# Patient Record
Sex: MALE | Race: WHITE | NOT HISPANIC OR LATINO | Employment: FULL TIME | URBAN - METROPOLITAN AREA
[De-identification: names, ages, dates, MRNs, and addresses within clinical notes are randomized per-mention and may not be internally consistent; named-entity substitution may affect disease eponyms.]

---

## 2017-01-10 ENCOUNTER — HOSPITAL ENCOUNTER (OUTPATIENT)
Dept: RADIOLOGY | Facility: MEDICAL CENTER | Age: 37
End: 2017-01-10
Attending: ORTHOPAEDIC SURGERY
Payer: COMMERCIAL

## 2017-01-10 DIAGNOSIS — M17.11 PRIMARY OSTEOARTHRITIS OF RIGHT KNEE: ICD-10-CM

## 2017-01-10 PROCEDURE — 73721 MRI JNT OF LWR EXTRE W/O DYE: CPT | Mod: RT

## 2017-04-17 ENCOUNTER — HOSPITAL ENCOUNTER (EMERGENCY)
Facility: MEDICAL CENTER | Age: 37
End: 2017-04-17
Attending: EMERGENCY MEDICINE
Payer: COMMERCIAL

## 2017-04-17 ENCOUNTER — NON-PROVIDER VISIT (OUTPATIENT)
Dept: OCCUPATIONAL MEDICINE | Facility: CLINIC | Age: 37
End: 2017-04-17
Payer: COMMERCIAL

## 2017-04-17 VITALS
WEIGHT: 315 LBS | DIASTOLIC BLOOD PRESSURE: 108 MMHG | HEIGHT: 72 IN | BODY MASS INDEX: 42.66 KG/M2 | TEMPERATURE: 98.6 F | RESPIRATION RATE: 16 BRPM | HEART RATE: 88 BPM | SYSTOLIC BLOOD PRESSURE: 156 MMHG

## 2017-04-17 DIAGNOSIS — Z02.1 PRE-EMPLOYMENT DRUG SCREENING: ICD-10-CM

## 2017-04-17 DIAGNOSIS — Z20.89 MENINGITIS EXPOSURE: ICD-10-CM

## 2017-04-17 DIAGNOSIS — Z02.83 ENCOUNTER FOR DRUG SCREENING: ICD-10-CM

## 2017-04-17 PROCEDURE — 80305 DRUG TEST PRSMV DIR OPT OBS: CPT | Performed by: INTERNAL MEDICINE

## 2017-04-17 PROCEDURE — 8899 PR URINE 11 PANEL - AFTER HOURS: Performed by: INTERNAL MEDICINE

## 2017-04-17 PROCEDURE — 700102 HCHG RX REV CODE 250 W/ 637 OVERRIDE(OP): Performed by: EMERGENCY MEDICINE

## 2017-04-17 PROCEDURE — A9270 NON-COVERED ITEM OR SERVICE: HCPCS | Performed by: EMERGENCY MEDICINE

## 2017-04-17 PROCEDURE — 99283 EMERGENCY DEPT VISIT LOW MDM: CPT

## 2017-04-17 PROCEDURE — 82075 ASSAY OF BREATH ETHANOL: CPT | Performed by: INTERNAL MEDICINE

## 2017-04-17 RX ORDER — CIPROFLOXACIN 500 MG/1
500 TABLET, FILM COATED ORAL ONCE
Status: COMPLETED | OUTPATIENT
Start: 2017-04-17 | End: 2017-04-17

## 2017-04-17 RX ADMIN — CIPROFLOXACIN HYDROCHLORIDE 500 MG: 500 TABLET, FILM COATED ORAL at 05:16

## 2017-04-17 NOTE — ED PROVIDER NOTES
"ED Provider Note    Scribed for Rebecca Herrera M.D. by Johanne Painter. 4/17/2017  6:45 AM    Primary care provider: Pcp Unknown  Means of arrival: Walk-In  History obtained from: Patient  History limited by: None    CHIEF COMPLAINT  Chief Complaint   Patient presents with   • Other     possible exposure to bacterial meningitis       HPI  Oswald Schuler is a 36 y.o. male who presents to the Emergency Department for a possible exposure to meningitis. The patient was exposed to a patient that had the possibility of bacterial meningitis. he denies any fevers, neck pain, or rashes.     REVIEW OF SYSTEMS  Neuro: no weakness, numbness, aphasia, or headache  Endocrine: no fevers, sweating, or weight loss   SKIN: no rash, erythema, or contusions     See history of present illness.     PAST MEDICAL HISTORY   has a past medical history of Coccyx pain and BMI 38.0-38.9,adult (8/9/2012).    SURGICAL HISTORY   has past surgical history that includes tonsillectomy; cyst excision; and mass excision general (2/10/2012).    SOCIAL HISTORY  Social History   Substance Use Topics   • Smoking status: Light Tobacco Smoker     Types: Cigarettes   • Smokeless tobacco: Never Used      Comment: pack a week for 4 yrs NONE anymore   • Alcohol Use: 3.0 - 4.0 oz/week     6-8 Standard drinks or equivalent per week      Comment: once a week      History   Drug Use No       FAMILY HISTORY  Family History   Problem Relation Age of Onset   • Hypertension Father    • Cancer Maternal Grandfather        CURRENT MEDICATIONS  No current facility-administered medications on file prior to encounter.     Current Outpatient Prescriptions on File Prior to Encounter   Medication Sig Dispense Refill   • hydrochlorothiazide (HYDRODIURIL) 12.5 MG tablet Take 1 Tab by mouth every day. 90 Tab 0       ALLERGIES  No Known Allergies    PHYSICAL EXAM  VITAL SIGNS: /108 mmHg  Pulse 88  Temp(Src) 37 °C (98.6 °F)  Resp 16  Ht 1.829 m (6' 0.01\")  Wt " 149.687 kg (330 lb)  BMI 44.75 kg/m2    Constitutional: Well developed, Well nourished, No acute distress, Non-toxic appearance.   HEENT: Normocephalic, Atraumatic,  external ears normal, pharynx pink,  Mucous  Membranes moist, No rhinorrhea or mucosal edema  Eyes: PERRL, EOMI, Conjunctiva normal, No discharge.   Neck: Normal range of motion, No tenderness, Supple, No stridor.   Lymphatic: No lymphadenopathy    Cardiovascular: Regular Rate and Rhythm, No murmurs,  rubs, or gallops.   Thorax & Lungs: Lungs clear to auscultation bilaterally, No respiratory distress, No wheezes, rhales or rhonchi, No chest wall tenderness.   , No erythema, No rash,   Neurologic: Alert & awake, no focal deficits  Psychiatric: Affect normal    DIAGNOSTIC STUDIES / PROCEDURES    LABS  CSF/ Body Fluids (from patient number [id number])  Number of tubes: 3  Volume: 24.0  Color-Body Fluid: Colorless  Character Body Fluid: Clear  Total WBC Count: 4  Total RBC: 4  Crenated RBC: 0  Lymphs: 77  Mononuclear Cells 23  CSF Tube number: 3  Glucose CSF: 57  Total protein, CSF: 32  Significant Indicator  .         Source  CSF        Site  TAP        Gram Stain Result  Many Gram negative rods.              All labs reviewed by me.    The second set of cultures is pending and will be followed up by workman's comp.    COURSE & MEDICAL DECISION MAKING  Nursing notes, VS, PMSFHx reviewed in chart.     6:45 AM - Patient seen and examined at bedside. Patient will be treated with 500mg Cipro.     6:53 AM The patient was informed that he will be notified of the lab results and followed up by workman's comp. He understood and verbalized agreement.     The patient will return for new or worsening symptoms and is stable at the time of discharge.    The patient is referred to a primary physician for blood pressure management, diabetic screening, and for all other preventative health concerns.    DISPOSITION:  Patient will be discharged home in stable  condition.    FOLLOW UP:  [facility]  [address]  [place] [address]  [phone]    Call in 1 day  to establish care, for recheck    FINAL IMPRESSION  1. Meningitis exposure          I, Johanne Painter (Scribe), am scribing for, and in the presence of, Rebecca Herrera M.D..    Electronically signed by: Johanne Painter (Yazmin), 4/17/2017    IRebecca M.D. personally performed the services described in this documentation, as scribed by Johanne Painter in my presence, and it is both accurate and complete.    The note accurately reflects work and decisions made by me.  Rebecca Herrera  4/17/2017  1:51 PM

## 2017-04-17 NOTE — ED AVS SNAPSHOT
TutorialTab Access Code: Activation code not generated  Current TutorialTab Status: Active    Vaxxashart  A secure, online tool to manage your health information     [employer]’s TutorialTab® is a secure, online tool that connects you to your personalized health information from the privacy of your home -- day or night - making it very easy for you to manage your healthcare. Once the activation process is completed, you can even access your medical information using the TutorialTab tex, which is available for free in the Apple Tex store or Google Play store.     TutorialTab provides the following levels of access (as shown below):   My Chart Features   [facility] Primary Care Doctor [facility]  Specialists [facility]  Urgent  Care [facility]  Primary Care  Doctor   Email your healthcare team securely and privately 24/7 X X X X   Manage appointments: schedule your next appointment; view details of past/upcoming appointments X      Request prescription refills. X      View recent personal medical records, including lab and immunizations X X X X   View health record, including health history, allergies, medications X X X X   Read reports about your outpatient visits, procedures, consult and ER notes X X X X   See your discharge summary, which is a recap of your hospital and/or ER visit that includes your diagnosis, lab results, and care plan. X X       How to register for TutorialTab:  1. Go to  [email or web address].  2. Click on the Sign Up Now box, which takes you to the New Member Sign Up page. You will need to provide the following information:  a. Enter your TutorialTab Access Code exactly as it appears at the top of this page. (You will not need to use this code after you’ve completed the sign-up process. If you do not sign up before the expiration date, you must request a new code.)   b. Enter your date of birth.   c. Enter your home email address.   d. Click Submit, and follow the next screen’s instructions.  3. Create a TutorialTab ID. This will  be your shopkick login ID and cannot be changed, so think of one that is secure and easy to remember.  4. Create a shopkick password. You can change your password at any time.  5. Enter your Password Reset Question and Answer. This can be used at a later time if you forget your password.   6. Enter your e-mail address. This allows you to receive e-mail notifications when new information is available in shopkick.  7. Click Sign Up. You can now view your health information.    For assistance activating your shopkick account, call [phone]

## 2017-04-17 NOTE — ED AVS SNAPSHOT
4/17/2017    Oswald Schuler  [address] [place] [address]    Dear Oswald:    [facility] wants to ensure your discharge home is safe and you or your loved ones have had all of your questions answered regarding your care after you leave the hospital.    Below is a list of resources and contact information should you have any questions regarding your hospital stay, follow-up instructions, or active medical symptoms.    Questions or Concerns Regarding… Contact   Medical Questions Related to Your Discharge  (7 days a week, 8am-5pm) Contact a Nurse Care Coordinator   [phone]   Medical Questions Not Related to Your Discharge  (24 hours a day / 7 days a week)  Contact the Nurse Health Line   [phone]    Medications or Discharge Instructions Refer to your discharge packet   or contact your [facility] Primary Care Provider   [phone]   Follow-up Appointment(s) Schedule your appointment via Mzinga   or contact Scheduling [phone]   Billing Review your statement via Mzinga  or contact Billing [phone]   Medical Records Review your records via Mzinga   or contact Medical Records [phone]     You may receive a telephone call within two days of discharge. This call is to make certain you understand your discharge instructions and have the opportunity to have any questions answered. You can also easily access your medical information, test results and upcoming appointments via the Mzinga free online health management tool. You can learn more and sign up at [email or web address]. For assistance setting up your Mzinga account, please call [phone].    Once again, we want to ensure your discharge home is safe and that you have a clear understanding of any next steps in your care. If you have any questions or concerns, please do not hesitate to contact us, we are here for you. Thank you for choosing [facility] for your healthcare needs.    Sincerely,    Your [facility] Healthcare Team

## 2017-04-17 NOTE — MR AVS SNAPSHOT
Oswald Schuler   2017 5:00 PM   Appointment   MRN: [id number]    Department:  [facility]   Dept Phone:  [phone]    Description:  Male : 1980   Provider:  [place] [facility]           Allergies as of 2017     No Known Allergies      Vital Signs     Smoking Status                   Light Tobacco Smoker           Basic Information     Date Of Birth Sex Race Ethnicity Preferred Language    1980 Male White Non- English      Problem List              ICD-10-CM Priority Class Noted - Resolved    Morbid obesity with BMI of 40.0-44.9, adult (CMS-[facility]) E66.01, Z68.41   2012 - Present    Essential hypertension I10   2016 - Present    Elevated TSH R94.6   2016 - Present      Health Maintenance        Date Due Completion Dates    IMM DTaP/Tdap/Td Vaccine (2 - Td) 2023, 2011            Current Immunizations     Influenza Vaccine Quad Inj (Preserved) 2016    TD Vaccine 2011    Tdap Vaccine 2013  4:37 PM    Tuberculin Skin Test 2014  7:25 AM, 2013  7:45 AM, 2012  6:43 AM, 2011  8:05 AM      Below and/or attached are the medications your provider expects you to take. Review all of your home medications and newly ordered medications with your provider and/or pharmacist. Follow medication instructions as directed by your provider and/or pharmacist. Please keep your medication list with you and share with your provider. Update the information when medications are discontinued, doses are changed, or new medications (including over-the-counter products) are added; and carry medication information at all times in the event of emergency situations     Allergies:  No Known Allergies          Medications  Valid as of: May 01, 2017 - 11:21 AM    Generic Name Brand Name Tablet Size Instructions for use    HydroCHLOROthiazide (Tab) HYDRODIURIL 12.5 MG Take 1 Tab by mouth every day.        .                 Medicines prescribed  today were sent to:     [facility] [place], [place] - [address]    [address] [address]    Phone: [phone] Fax: [phone]    Open 24 Hours?: No      Medication refill instructions:       If your prescription bottle indicates you have medication refills left, it is not necessary to call your provider’s office. Please contact your pharmacy and they will refill your medication.    If your prescription bottle indicates you do not have any refills left, you may request refills at any time through one of the following ways: The online HourVille system (except Urgent Care), by calling your provider’s office, or by asking your pharmacy to contact your provider’s office with a refill request. Medication refills are processed only during regular business hours and may not be available until the next business day. Your provider may request additional information or to have a follow-up visit with you prior to refilling your medication.   *Please Note: Medication refills are assigned a new Rx number when refilled electronically. Your pharmacy may indicate that no refills were authorized even though a new prescription for the same medication is available at the pharmacy. Please request the medicine by name with the pharmacy before contacting your provider for a refill.           H[facility]ille Access Code: Activation code not generated  Current [facility] Status: Active          Quit Tobacco Information     Do you want to quit using tobacco?    Quitting tobacco decreases risks of cancer, heart and lung disease, increases life expectancy, improves sense of taste and smell, and increases spending money, among other benefits.    If you are thinking about quitting, we can help.  • [facility] Quit Tobacco Program: 262.766.7857  o Program occurs weekly for four weeks and includes pharmacist consultation on products to support quitting smoking or chewing tobacco. A provider referral is needed for pharmacist  consultation.  • Tobacco Users Help Hotline: 800-QUIT-NOW (523-3823) or https://[place].quitlogix.org/  o Free, confidential telephone and online coaching for [place] residents. Sessions are designed on a schedule that is convenient for you. Eligible clients receive free nicotine replacement therapy.  • Nationally: www.smokefree.gov  o Information and professional assistance to support both immediate and long-term needs as you become, and remain, a non-smoker. Smokefree.gov allows you to choose the help that best fits your needs.

## 2017-04-17 NOTE — LETTER
"  FORM C-4:  EMPLOYEE’S CLAIM FOR COMPENSATION/ REPORT OF INITIAL TREATMENT  EMPLOYEE’S CLAIM - PROVIDE ALL INFORMATION REQUESTED   First Name  Oswald Last Name  Ganga Birthdate             Age  1980 36 y.o. 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 (if applicable)  Triage    How did this injury or occupational disease occur? Be specific and answer in detail. 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  N/A Witnesses to the Accident  None     Nature of Injury or Occupational Disease  Workers' Compensation  Part(s) of Body Injured or Affected  Internal Organs, N/A, N/A    I certify that the above is true and correct to the best of my knowledge and that I have provided this information in order to obtain the " benefits of [place] inclusive or Chapter 617 of [place]RS).  I hereby authorize any physician, chiropractor, surgeon, practitioner, or other person, any hospital, including [facility] hospital, any medical service organization, any insurance company, or other institution or organization to release to each other, any medical or other information, including benefits paid or payable, pertinent to this injury or disease, except information relative to diagnosis, treatment and/or counseling for AIDS, psychological conditions, alcohol or controlled substances, for which I must give specific authorization.  A Photostat of this authorization shall be as valid as the original.   Date  4/17/2017 [facility] Employee’s Signature   THIS REPORT MUST BE COMPLETED AND MAILED WITHIN 3 WORKING DAYS OF TREATMENT   Place  [facility], EMERGENCY DEPT  Name of Facility   [facility]   Date  4/17/2017 Diagnosis  (Z20.89) Meningitis exposure Is there evidence the injured employee was under the influence of alcohol and/or another controlled substance at the time of accident?   Hour  7:22 AM Description of Injury or Disease  Meningitis exposure     Treatment     Have you advised the patient to remain off work five days or more?             X-Ray Findings      If Yes   From Date    To Date      From information given by the employee, together with medical evidence, can you directly connect this injury or occupational disease as job incurred?    If No, is the employee capable of: Full Duty    Modified Duty      Is additional medical care by a physician indicated?    If Modified Duty, Specify any Limitations / Restrictions        Do you know of any previous injury or disease contributing to this condition or occupational disease?      Date  4/17/2017 Print Doctor’s Name  Rebecca Herrera I certify the  "employer’s copy of this form was mailed on:   Address  [address] [address] [phone] Insurer’s Use Only   [facility]  [address]    Provider’s Tax ID Number  [id number] Telephone  Dept: [phone]    Doctor’s Signature    Degree   MD    Original - TREATING PHYSICIAN OR CHIROPRACTOR   Pg 2-Insurer/TPA   Pg 3-Employer   Pg 4-Employee                                                                                                  Form C-4 (rev01/03)     BRIEF DESCRIPTION OF RIGHTS AND BENEFITS  (Pursuant to NRS 616C.050)    Notice of Injury or Occupational Disease (Incident Report Form C-1): If an injury or occupational disease (OD) arises out of and in the course of employment, you must provide written notice to your employer as soon as practicable, but no later than 7 days after the accident or OD. Your employer shall maintain a sufficient supply of the required forms.    Claim for Compensation (Form C-4): If medical treatment is sought, the form C-4 is available at the place of initial treatment. A completed \"Claim for Compensation\" (Form C-4) must be filed within 90 days after an accident or OD. The treating physician or chiropractor must, within 3 working days after treatment, complete and mail to the employer, the employer's insurer and third-party , the Claim for Compensation.    Medical Treatment: If you require medical treatment for your on-the-job injury or OD, you may be required to select a physician or chiropractor from a list provided by your workers’ compensation insurer, if it has contracted with an Organization for Managed Care (MCO) or Preferred Provider Organization (PPO) or providers of health care. If your employer has not entered into a contract with an MCO or PPO, you may select a physician or chiropractor from the Panel of Physicians and Chiropractors. Any medical costs related to your industrial injury or OD will be paid by your " insurer.    Temporary Total Disability (TTD): If your doctor has certified that you are unable to work for a period of at least 5 consecutive days, or 5 cumulative days in a 20-day period, or places restrictions on you that your employer does not accommodate, you may be entitled to TTD compensation.    Temporary Partial Disability (TPD): If the wage you receive upon reemployment is less than the compensation for TTD to which you are entitled, the insurer may be required to pay you TPD compensation to make up the difference. TPD can only be paid for a maximum of 24 months.    Permanent Partial Disability (PPD): When your medical condition is stable and there is an indication of a PPD as a result of your injury or OD, within 30 days, your insurer must arrange for an evaluation by a rating physician or chiropractor to determine the degree of your PPD. The amount of your PPD award depends on the date of injury, the results of the PPD evaluation and your age and wage.    Permanent Total Disability (PTD): If you are medically certified by a treating physician or chiropractor as permanently and totally disabled and have been granted a PTD status by your insurer, you are entitled to receive monthly benefits not to exceed 66 2/3% of your average monthly wage. The amount of your PTD payments is subject to reduction if you previously received a PPD award.    Vocational Rehabilitation Services: You may be eligible for vocational rehabilitation services if you are unable to return to the job due to a permanent physical impairment or permanent restrictions as a result of your injury or occupational disease.    Transportation and Per Mercy Reimbursement: You may be eligible for travel expenses and per mercy associated with medical treatment.  Reopening: You may be able to reopen your claim if your condition worsens after claim closure.    Appeal Process: If you disagree with a written determination issued by the insurer or the  insurer does not respond to your request, you may appeal to the Department of Administration, , by following the instructions contained in your determination letter. You must appeal the determination within 70 days from the date of the determination letter at [address], [address], [place], [place] [address], or [address], [address], [place], [place] [address]. If you disagree with the  decision, you may appeal to the Department of Administration, . You must file your appeal within 30 days from the date of the  decision letter at [address], [address], [place], [place] [address], or [address], [address], [place], [place] [address]. If you disagree with a decision of an , you may file a petition for judicial review with the District Court. You must do so within 30 days of the Appeal Officer’s decision. You may be represented by an  at your own expense or you may contact the [employer] for possible representation.    [place]  for Injured Workers (NAIW): If you disagree with a  decision, you may request that NAIW represent you without charge at an  Hearing. For information regarding denial of benefits, you may contact the [employer] at: [address], [address], [place] [address], [phone]) [phone], or [address], [address], [place] [address], [phone]    To File a Complaint with the Division: If you wish to file a complaint with the  of the Division of Industrial Relations (DIR), please contact the Workers’ Compensation Section, [address], [address], [place], [place] [address], telephone (893) 470-7704, or [address], [place] [address], telephone [phone].    For assistance with Workers’ Compensation Issues: you may contact the Office of the [facility] Consumer Health Assistance, [address], [address], [place], [place] [address], Toll Free 1-874.852.7445, Web site: http://tacha.[facility].nv., E-mail [email or web address].[email or web address].                                                                                                                                                                               __________________________________________________________________                                    ________4/17/2017_________            Employee Name / Signature                                                                                                                            Date                                       D-2 (rev. 10/07)

## 2017-04-17 NOTE — DISCHARGE INSTRUCTIONS
Follow up with workman's comp for culture results and further testing as needed. Return for headache, fever or worsening symptoms

## 2017-04-17 NOTE — LETTER
"  FORM C-4:  EMPLOYEE’S CLAIM FOR COMPENSATION/ REPORT OF INITIAL TREATMENT  EMPLOYEE’S CLAIM - PROVIDE ALL INFORMATION REQUESTED   First Name  Oswald Last Name  Ganga Birthdate             Age  1980 36 y.o. 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 (if applicable)  Triage    How did this injury or occupational disease occur? Be specific and answer in detail. 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  N/A Witnesses to the Accident  None     Nature of Injury or Occupational Disease  Workers' Compensation  Part(s) of Body Injured or Affected  Internal Organs, N/A, N/A    I certify that the above is true and correct to the best of my knowledge and that I have provided this information in order to obtain the " benefits of [place] 616[place] 616[place], inclusive or Chapter 617 of [place]RS).  I hereby authorize any physician, chiropractor, surgeon, practitioner, or other person, any hospital, including [facility] hospital, any medical service organization, any insurance company, or other institution or organization to release to each other, any medical or other information, including benefits paid or payable, pertinent to this injury or disease, except information relative to diagnosis, treatment and/or counseling for AIDS, psychological conditions, alcohol or controlled substances, for which I must give specific authorization.  A Photostat of this authorization shall be as valid as the original.   Date Place  [facility] Employee’s Signature   THIS REPORT MUST BE COMPLETED AND MAILED WITHIN 3 WORKING DAYS OF TREATMENT   Place  [facility], EMERGENCY DEPT  Name of Facility   [facility]   Date  4/17/2017 Diagnosis  (Z20.89) Meningitis exposure Is there evidence the injured employee was under the influence of alcohol and/or another controlled substance at the time of accident?   Hour  10:30 AM Description of Injury or Disease  Meningitis exposure     Treatment  cipro  Have you advised the patient to remain off work five days or more?         No   X-Ray Findings    Comments:na   If Yes   From Date    To Date      From information given by the employee, together with medical evidence, can you directly connect this injury or occupational disease as job incurred?  Yes If No, is the employee capable of: Full Duty  Yes Modified Duty      Is additional medical care by a physician indicated?  Yes If Modified Duty, Specify any Limitations / Restrictions        Do you know of any previous injury or disease contributing to this condition or occupational disease?  No   Date  4/19/2017 Print Doctor’s Name  Rebecca Herrera  "certify the employer’s copy of this form was mailed on:   Address  [address]  [place] [address] [phone] Insurer’s Use Only   [facility]  [address]    Provider’s Tax ID Number  [id number] Telephone  Dept: [phone]    Doctor’s Signature  e-MICHAEL Dey M.D. Degree  MTHELMA.    Original - TREATING PHYSICIAN OR CHIROPRACTOR   Pg 2-Insurer/TPA   Pg 3-Employer   Pg 4-Employee                                                                                                  Form C-4 (rev01/03)   BRIEF DESCRIPTION OF RIGHTS AND BENEFITS  ([place]ursuant to [place]RS 616C.050)  Notice of Injury or Occupational Disease (Incident Report Form C-1): If an injury or occupational disease (OD) arises out of and in the course of employment, you must provide written notice to your employer as soon as practicable, but no later than 7 days after the accident or OD. Your employer shall maintain a sufficient supply of the required forms.    Claim for Compensation (Form C-4): If medical treatment is sought, the form C-4 is available at the place of initial treatment. A completed \"Claim for Compensation\" (Form C-4) must be filed within 90 days after an accident or OD. The treating physician or chiropractor must, within 3 working days after treatment, complete and mail to the employer, the employer's insurer and third-party , the Claim for Compensation.    Medical Treatment: If you require medical treatment for your on-the-job injury or OD, you may be required to select a physician or chiropractor from a list provided by your workers’ compensation insurer, if it has contracted with an Organization for Managed Care (MCO) or Preferred Provider Organization (PPO) or providers of health care. If your employer has not entered into a contract with an MCO or PPO, you may select a physician or chiropractor from the Panel of Physicians and Chiropractors. Any medical costs related to your industrial injury or OD " will be paid by your insurer.    Temporary Total Disability (TTD): If your doctor has certified that you are unable to work for a period of at least 5 consecutive days, or 5 cumulative days in a 20-day period, or places restrictions on you that your employer does not accommodate, you may be entitled to TTD compensation.    Temporary Partial Disability (TPD): If the wage you receive upon reemployment is less than the compensation for TTD to which you are entitled, the insurer may be required to pay you TPD compensation to make up the difference. TPD can only be paid for a maximum of 24 months.    Permanent Partial Disability (PPD): When your medical condition is stable and there is an indication of a PPD as a result of your injury or OD, within 30 days, your insurer must arrange for an evaluation by a rating physician or chiropractor to determine the degree of your PPD. The amount of your PPD award depends on the date of injury, the results of the PPD evaluation and your age and wage.    Permanent Total Disability (PTD): If you are medically certified by a treating physician or chiropractor as permanently and totally disabled and have been granted a PTD status by your insurer, you are entitled to receive monthly benefits not to exceed 66 2/3% of your average monthly wage. The amount of your PTD payments is subject to reduction if you previously received a PPD award.    Vocational Rehabilitation Services: You may be eligible for vocational rehabilitation services if you are unable to return to the job due to a permanent physical impairment or permanent restrictions as a result of your injury or occupational disease.    Transportation and Per Mercy Reimbursement: You may be eligible for travel expenses and per mercy associated with medical treatment.  Reopening: You may be able to reopen your claim if your condition worsens after claim closure.    Appeal Process: If you disagree with a written determination issued by the  insurer or the insurer does not respond to your request, you may appeal to the Department of Administration, , by following the instructions contained in your determination letter. You must appeal the determination within 70 days from the date of the determination letter at [address], [address], [place], [place] [address], or [address], [address], [place], [place] [address]. If you disagree with the  decision, you may appeal to the Department of Administration, . You must file your appeal within 30 days from the date of the  decision letter at [address], [address], [place], [place] [address], or [address], [place], [place] [address]. If you disagree with a decision of an , you may file a petition for judicial review with the District Court. You must do so within 30 days of the Appeal Officer’s decision. You may be represented by an  at your own expense or you may contact the [facility] for possible representation.    [place]  for Injured Workers ([place]IW): If you disagree with a  decision, you may request that NAIW represent you without charge at an  Hearing. For information regarding denial of benefits, you may contact the [facility] at: [address], [address], [place] [address], [phone]) [phone], or [address], [place] [address], [phone]    To File a Complaint with the Division: If you wish to file a complaint with the  of the Division of Industrial Relations (DIR), please contact the Workers’ Compensation Section, [address], [address], [place], [place] [address], telephone (218) 545-3734, or [address], [place] [address], telephone [phone]) [phone].    For assistance with Workers’ Compensation Issues: you may contact the Office of the Governor Consumer Health  Assistance, [address], [address], [place], [place] [address], Toll Free 1-648.584.7842, Web site: http://sweta.[facility].nv., E-mail [email or web address].                                                                                                                                                         __________________________________________________________________                                    _________________            Employee Name / Signature                                                                                                                            Date                                       D-2 (rev. 10/07)

## 2017-04-17 NOTE — ED NOTES
Oswald Trejo [facility]  Chief Complaint   Patient presents with   • Other     possible exposure to bacterial meningitis     Medicated per MAR.

## 2017-04-17 NOTE — ED AVS SNAPSHOT
Home Care Instructions                                                                                                                Oswald Schuler   MRN: [id number]    Department:  [facility], Emergency Dept   Date of Visit:  4/17/2017            [facility], Emergency Dept    [address]    [place] [address]    Phone:  [phone]      You were seen by     Rebecca Herrera M.D.      Your Diagnosis Was     Meningitis exposure     Z20.89       These are the medications you received during your hospitalization from 04/17/2017 0507 to 04/17/2017 0814     Date/Time Order Dose Route Action    04/17/2017 0516 ciprofloxacin (CIPRO) tablet 500 mg 500 mg Oral Given      Follow-up Information     1. Follow up with [facility]. Call in 1 day.    Why:  to establish care, for recheck    Contact information    [address] [place] [address] [phone]        Medication Information     Review all of your home medications and newly ordered medications with your primary doctor and/or pharmacist as soon as possible. Follow medication instructions as directed by your doctor and/or pharmacist.     Please keep your complete medication list with you and share with your physician. Update the information when medications are discontinued, doses are changed, or new medications (including over-the-counter products) are added; and carry medication information at all times in the event of emergency situations.               Medication List      ASK your doctor about these medications        Instructions    Morning Afternoon Evening Bedtime    hydrochlorothiazide 12.5 MG tablet   Commonly known as:  HYDRODIURIL        Doctor's comments:  Pt to make appt and get labs prior to more refills. This is an authorization for a requested refill   Take 1 Tab by mouth every day.   Dose:  12.5 mg                                  Discharge Instructions       Follow up with workman's comp for  culture results and further testing as needed. Return for headache, fever or worsening symptoms          Patient Information     Patient Information    Following emergency treatment: all patient requiring follow-up care must return either to a private physician or a clinic if your condition worsens before you are able to obtain further medical attention, please return to the emergency room.     Billing Information    At [facility], we work to make the billing process streamlined for our patients.  Our Representatives are here to answer any questions you may have regarding your hospital bill.  If you have insurance coverage and have supplied your insurance information to us, we will submit a claim to your insurer on your behalf.  Should you have any questions regarding your bill, we can be reached online or by phone as follows:  Online: You are able pay your bills online or live chat with our representatives about any billing questions you may have. We are here to help Monday - Friday from 8:00am to 7:30pm and 9:00am - 12:00pm on Saturdays.  Please visit [email or web address]  for more information.   Phone:  [phone] or [phone]    Please note that your emergency physician, surgeon, pathologist, radiologist, anesthesiologist, and other specialists are not employed by [facility] and will therefore bill separately for their services.  Please contact them directly for any questions concerning their bills at the numbers below:     Emergency Physician Services:  [phone]  [facility]:  [phone]  [facility]:  [phone]  [facility]:  [phone]    1. Your final bill may vary from the amount quoted upon discharge if all procedures are not complete at that time, or if your doctor has additional procedures of which we are not aware. You will receive an additional bill if you return to the Emergency Department at [facility]  [facility] for suture removal regardless of the facility of which the sutures were placed.     2. Please arrange for settlement of this account at the emergency registration.    3. All self-pay accounts are due in full at the time of treatment.  If you are unable to meet this obligation then payment is expected within 4-5 days.     4. If you have had radiology studies (CT, X-ray, Ultrasound, MRI), you have received a preliminary result during your emergency department visit. Please contact the radiology department [phone] to receive a copy of your final result. Please discuss the Final result with your primary physician or with the follow up physician provided.     Crisis Hotline:  [place] Crisis Hotline:  5-799-YMBMDNK or 1-668.498.5030  [place] Crisis Hotline:    1-965.242.5310 or 846-334-7485         ED Discharge Follow Up Questions    1. In order to provide you with very good care, we would like to follow up with a phone call in the next few days.  May we have your permission to contact you?     YES /  NO    2. What is the best phone number to call you? (       )_____-__________    3. What is the best time to call you?      Morning  /  Afternoon  /  Evening                   Patient Signature:  ____________________________________________________________    Date:  ____________________________________________________________

## 2017-04-17 NOTE — ED NOTES
Pt care assumed for discharge only.  Patient's discharge and follow-up were discussed with patient by ERP.  Pt given d/c instructions and f/u info with verbal understanding.  Pt ambulatory from the ED w/ steady gait.  All belongings in possession on discharge.  Pt escorted to the lobby by RN.  Pt verbalized understanding of discharge instructions at discharge. Discharged home in good condition.

## 2017-05-01 LAB
AMP AMPHETAMINE: NORMAL
BAR BARBITURATES: NORMAL
BREATH ALCOHOL COMMENT: NORMAL
BZO BENZODIAZEPINES: NORMAL
COC COCAINE: NORMAL
INT CON NEG: NORMAL
INT CON POS: NORMAL
MDMA ECSTASY: NORMAL
MET METHAMPHETAMINES: NORMAL
MTD METHADONE: NORMAL
OPI OPIATES: NORMAL
OXY OXYCODONE: NORMAL
PCP PHENCYCLIDINE: NORMAL
POC BREATHALIZER: 0 PERCENT (ref 0–0.01)
POC URINE DRUG SCREEN OCDRS: NORMAL
THC: NORMAL

## 2017-05-01 NOTE — PROGRESS NOTES
Yesy HAYNES was called out After business hours to ER for a Post Accident UDS and BAT on 4/17/17 for [facility].    UDS collected at 7:25am.  BAT collected at 7:20am.

## 2017-09-15 ENCOUNTER — EH NON-PROVIDER (OUTPATIENT)
Dept: OCCUPATIONAL MEDICINE | Facility: CLINIC | Age: 37
End: 2017-09-15

## 2017-09-15 DIAGNOSIS — Z29.89 NEED FOR ISOLATION: ICD-10-CM

## 2017-09-15 PROCEDURE — 94375 RESPIRATORY FLOW VOLUME LOOP: CPT

## 2018-02-02 ENCOUNTER — OFFICE VISIT (OUTPATIENT)
Dept: URGENT CARE | Facility: PHYSICIAN GROUP | Age: 38
End: 2018-02-02
Payer: COMMERCIAL

## 2018-02-02 VITALS
HEART RATE: 86 BPM | RESPIRATION RATE: 16 BRPM | SYSTOLIC BLOOD PRESSURE: 136 MMHG | BODY MASS INDEX: 44.2 KG/M2 | DIASTOLIC BLOOD PRESSURE: 88 MMHG | TEMPERATURE: 98.8 F | WEIGHT: 315 LBS | OXYGEN SATURATION: 96 %

## 2018-02-02 DIAGNOSIS — J01.00 ACUTE NON-RECURRENT MAXILLARY SINUSITIS: ICD-10-CM

## 2018-02-02 PROCEDURE — 99214 OFFICE O/P EST MOD 30 MIN: CPT | Performed by: PHYSICIAN ASSISTANT

## 2018-02-02 RX ORDER — AZITHROMYCIN 250 MG/1
TABLET, FILM COATED ORAL
Qty: 6 TAB | Refills: 0 | Status: ON HOLD | OUTPATIENT
Start: 2018-02-02 | End: 2019-02-06

## 2018-02-02 ASSESSMENT — ENCOUNTER SYMPTOMS
FEVER: 0
DIZZINESS: 0
SINUS PAIN: 1
RESPIRATORY NEGATIVE: 1
SORE THROAT: 0
SINUS PRESSURE: 1
MUSCULOSKELETAL NEGATIVE: 1
CARDIOVASCULAR NEGATIVE: 1
CHILLS: 0
HEADACHES: 1
GASTROINTESTINAL NEGATIVE: 1

## 2018-02-03 NOTE — PROGRESS NOTES
Subjective:      Oswald Schuler is a 37 y.o. male who presents with Sinus Problem (started yesterday, pressure in sinuses. )            Sinus Problem   This is a new problem. The current episode started yesterday. The problem has been gradually worsening since onset. There has been no fever. The fever has been present for less than 1 day. Associated symptoms include congestion, headaches and sinus pressure. Pertinent negatives include no chills, ear pain or sore throat. Past treatments include saline sprays and oral decongestants. The treatment provided mild relief.       PMH:  has a past medical history of BMI 38.0-38.9,adult (8/9/2012) and Coccyx pain. He also has no past medical history of Anemia; GERD (gastroesophageal reflux disease); Heart murmur; Ulcer; or Urinary tract infection, site not specified.  MEDS:   Current Outpatient Prescriptions:   •  hydrochlorothiazide (HYDRODIURIL) 12.5 MG tablet, Take 1 Tab by mouth every day., Disp: 90 Tab, Rfl: 0  ALLERGIES: No Known Allergies  SURGHX:   Past Surgical History:   Procedure Laterality Date   • MASS EXCISION GENERAL  2/10/2012    Performed by BRUNILDA LOPEZ at [facility] ORS   • CYST EXCISION      right shoulder   • TONSILLECTOMY       SOCHX:  reports that he has been smoking Cigarettes.  He has never used smokeless tobacco. He reports that he drinks about 3.0 - 4.0 oz of alcohol per week . He reports that he does not use drugs.  FH: family history includes Cancer in his maternal grandfather; Hypertension in his father.      Review of Systems   Constitutional: Negative for chills and fever.   HENT: Positive for congestion, sinus pain and sinus pressure. Negative for ear pain and sore throat.    Respiratory: Negative.    Cardiovascular: Negative.    Gastrointestinal: Negative.    Musculoskeletal: Negative.    Neurological: Positive for headaches. Negative for dizziness.       Medications, Allergies, and current problem list reviewed today in  Epic     Objective:     /88   Pulse 86   Temp 37.1 °C (98.8 °F)   Resp 16   Wt (!) 147.9 kg (326 lb)   SpO2 96%   BMI 44.20 kg/m²      Physical Exam   Constitutional: He is oriented to person, place, and time. He appears well-developed and well-nourished. No distress.   HENT:   Head: Normocephalic and atraumatic.   Right Ear: Hearing, tympanic membrane and external ear normal.   Left Ear: Hearing, tympanic membrane and external ear normal.   Nose: Right sinus exhibits maxillary sinus tenderness. Left sinus exhibits maxillary sinus tenderness.   Mouth/Throat: Normal dentition. No dental caries. Posterior oropharyngeal erythema present. No oropharyngeal exudate.   Eyes: Conjunctivae and EOM are normal. Pupils are equal, round, and reactive to light. Right eye exhibits no discharge. Left eye exhibits no discharge.   Neck: Normal range of motion. Neck supple.   Cardiovascular: Normal rate, regular rhythm and normal heart sounds.    Pulmonary/Chest: Effort normal and breath sounds normal. No respiratory distress. He has no wheezes.   Lymphadenopathy:        Head (right side): Submandibular adenopathy present.        Head (left side): Submandibular adenopathy present.     He has no cervical adenopathy.        Right cervical: No superficial cervical adenopathy present.       Left cervical: No superficial cervical adenopathy present.   Neurological: He is alert and oriented to person, place, and time.   Skin: Skin is warm and dry. He is not diaphoretic. No cyanosis. Nails show no clubbing.   Psychiatric: He has a normal mood and affect. His behavior is normal. Judgment and thought content normal.   Nursing note and vitals reviewed.              Assessment/Plan:     1. Acute non-recurrent maxillary sinusitis  azithromycin (ZITHROMAX) 250 MG Tab     Take full course of antibiotic. Patient states [employer] always worked for him. I informed him that this may not be strong enough. He wishes to continue his Z-Rakan  anyway.  Tylenol and Motrin for fever and pain  OTC meds as discussed including oral decongestant if tolerated  Increase fluids and rest  Nasal spray, nasal wash, Renetta pot  Return to clinic or go to ED if symptoms worsen or persist. Indications for ED discussed at length. Patient voices understanding. Follow-up with your primary care provider in 3-5 days. Red flags discussed.    Please note that this dictation was created using voice recognition software. I have made every reasonable attempt to correct obvious errors, but I expect that there are errors of grammar and possibly content that I did not discover before finalizing the note.

## 2018-09-28 ENCOUNTER — IMMUNIZATION (OUTPATIENT)
Dept: OCCUPATIONAL MEDICINE | Facility: CLINIC | Age: 38
End: 2018-09-28

## 2018-09-28 DIAGNOSIS — Z23 NEED FOR VACCINATION: ICD-10-CM

## 2018-09-28 PROCEDURE — 90686 IIV4 VACC NO PRSV 0.5 ML IM: CPT | Performed by: PREVENTIVE MEDICINE

## 2019-02-05 ENCOUNTER — HOSPITAL ENCOUNTER (INPATIENT)
Facility: MEDICAL CENTER | Age: 39
LOS: 1 days | DRG: 897 | End: 2019-02-07
Attending: EMERGENCY MEDICINE | Admitting: HOSPITALIST
Payer: COMMERCIAL

## 2019-02-05 ENCOUNTER — APPOINTMENT (OUTPATIENT)
Dept: RADIOLOGY | Facility: MEDICAL CENTER | Age: 39
DRG: 897 | End: 2019-02-05
Attending: EMERGENCY MEDICINE
Payer: COMMERCIAL

## 2019-02-05 LAB
ANION GAP SERPL CALC-SCNC: 15 MMOL/L (ref 0–11.9)
APTT PPP: 29.9 SEC (ref 24.7–36)
BASOPHILS # BLD AUTO: 0.8 % (ref 0–1.8)
BASOPHILS # BLD: 0.11 K/UL (ref 0–0.12)
BUN SERPL-MCNC: 16 MG/DL (ref 8–22)
CALCIUM SERPL-MCNC: 9.4 MG/DL (ref 8.5–10.5)
CHLORIDE SERPL-SCNC: 102 MMOL/L (ref 96–112)
CO2 SERPL-SCNC: 23 MMOL/L (ref 20–33)
CREAT SERPL-MCNC: 1.03 MG/DL (ref 0.5–1.4)
EKG IMPRESSION: NORMAL
EOSINOPHIL # BLD AUTO: 0.22 K/UL (ref 0–0.51)
EOSINOPHIL NFR BLD: 1.6 % (ref 0–6.9)
ERYTHROCYTE [DISTWIDTH] IN BLOOD BY AUTOMATED COUNT: 44.8 FL (ref 35.9–50)
GLUCOSE SERPL-MCNC: 107 MG/DL (ref 65–99)
HCT VFR BLD AUTO: 53.6 % (ref 42–52)
HGB BLD-MCNC: 18.1 G/DL (ref 14–18)
IMM GRANULOCYTES # BLD AUTO: 0.17 K/UL (ref 0–0.11)
IMM GRANULOCYTES NFR BLD AUTO: 1.2 % (ref 0–0.9)
INR PPP: 1 (ref 0.87–1.13)
LYMPHOCYTES # BLD AUTO: 4.15 K/UL (ref 1–4.8)
LYMPHOCYTES NFR BLD: 30.4 % (ref 22–41)
MCH RBC QN AUTO: 30.4 PG (ref 27–33)
MCHC RBC AUTO-ENTMCNC: 33.8 G/DL (ref 33.7–35.3)
MCV RBC AUTO: 89.9 FL (ref 81.4–97.8)
MONOCYTES # BLD AUTO: 1.22 K/UL (ref 0–0.85)
MONOCYTES NFR BLD AUTO: 9 % (ref 0–13.4)
NEUTROPHILS # BLD AUTO: 7.76 K/UL (ref 1.82–7.42)
NEUTROPHILS NFR BLD: 57 % (ref 44–72)
NRBC # BLD AUTO: 0 K/UL
NRBC BLD-RTO: 0 /100 WBC
PLATELET # BLD AUTO: 327 K/UL (ref 164–446)
PMV BLD AUTO: 9.9 FL (ref 9–12.9)
POTASSIUM SERPL-SCNC: 3.4 MMOL/L (ref 3.6–5.5)
PROTHROMBIN TIME: 13.3 SEC (ref 12–14.6)
RBC # BLD AUTO: 5.96 M/UL (ref 4.7–6.1)
SODIUM SERPL-SCNC: 140 MMOL/L (ref 135–145)
WBC # BLD AUTO: 13.6 K/UL (ref 4.8–10.8)

## 2019-02-05 PROCEDURE — 700102 HCHG RX REV CODE 250 W/ 637 OVERRIDE(OP): Performed by: EMERGENCY MEDICINE

## 2019-02-05 PROCEDURE — 96374 THER/PROPH/DIAG INJ IV PUSH: CPT

## 2019-02-05 PROCEDURE — A9270 NON-COVERED ITEM OR SERVICE: HCPCS | Performed by: EMERGENCY MEDICINE

## 2019-02-05 PROCEDURE — 85610 PROTHROMBIN TIME: CPT

## 2019-02-05 PROCEDURE — 83735 ASSAY OF MAGNESIUM: CPT

## 2019-02-05 PROCEDURE — 85730 THROMBOPLASTIN TIME PARTIAL: CPT

## 2019-02-05 PROCEDURE — 83036 HEMOGLOBIN GLYCOSYLATED A1C: CPT

## 2019-02-05 PROCEDURE — 84484 ASSAY OF TROPONIN QUANT: CPT

## 2019-02-05 PROCEDURE — 99285 EMERGENCY DEPT VISIT HI MDM: CPT

## 2019-02-05 PROCEDURE — 84100 ASSAY OF PHOSPHORUS: CPT

## 2019-02-05 PROCEDURE — 96375 TX/PRO/DX INJ NEW DRUG ADDON: CPT

## 2019-02-05 PROCEDURE — 93005 ELECTROCARDIOGRAM TRACING: CPT | Performed by: EMERGENCY MEDICINE

## 2019-02-05 PROCEDURE — 700105 HCHG RX REV CODE 258: Performed by: EMERGENCY MEDICINE

## 2019-02-05 PROCEDURE — 71045 X-RAY EXAM CHEST 1 VIEW: CPT

## 2019-02-05 PROCEDURE — 700111 HCHG RX REV CODE 636 W/ 250 OVERRIDE (IP): Performed by: EMERGENCY MEDICINE

## 2019-02-05 PROCEDURE — 85025 COMPLETE CBC W/AUTO DIFF WBC: CPT

## 2019-02-05 PROCEDURE — 80048 BASIC METABOLIC PNL TOTAL CA: CPT

## 2019-02-05 RX ORDER — LORAZEPAM 2 MG/ML
0.5 INJECTION INTRAMUSCULAR ONCE
Status: DISCONTINUED | OUTPATIENT
Start: 2019-02-05 | End: 2019-02-05

## 2019-02-05 RX ORDER — ASPIRIN 81 MG/1
324 TABLET, CHEWABLE ORAL ONCE
Status: COMPLETED | OUTPATIENT
Start: 2019-02-05 | End: 2019-02-05

## 2019-02-05 RX ORDER — SODIUM CHLORIDE, SODIUM LACTATE, POTASSIUM CHLORIDE, CALCIUM CHLORIDE 600; 310; 30; 20 MG/100ML; MG/100ML; MG/100ML; MG/100ML
1000 INJECTION, SOLUTION INTRAVENOUS ONCE
Status: COMPLETED | OUTPATIENT
Start: 2019-02-06 | End: 2019-02-06

## 2019-02-05 RX ORDER — HYDRALAZINE HYDROCHLORIDE 20 MG/ML
10 INJECTION INTRAMUSCULAR; INTRAVENOUS ONCE
Status: COMPLETED | OUTPATIENT
Start: 2019-02-05 | End: 2019-02-05

## 2019-02-05 RX ORDER — SODIUM CHLORIDE, SODIUM LACTATE, POTASSIUM CHLORIDE, CALCIUM CHLORIDE 600; 310; 30; 20 MG/100ML; MG/100ML; MG/100ML; MG/100ML
1000 INJECTION, SOLUTION INTRAVENOUS ONCE
Status: DISCONTINUED | OUTPATIENT
Start: 2019-02-05 | End: 2019-02-05

## 2019-02-05 RX ORDER — SODIUM CHLORIDE, SODIUM LACTATE, POTASSIUM CHLORIDE, CALCIUM CHLORIDE 600; 310; 30; 20 MG/100ML; MG/100ML; MG/100ML; MG/100ML
500 INJECTION, SOLUTION INTRAVENOUS ONCE
Status: DISCONTINUED | OUTPATIENT
Start: 2019-02-05 | End: 2019-02-05

## 2019-02-05 RX ORDER — LORAZEPAM 2 MG/ML
1 INJECTION INTRAMUSCULAR ONCE
Status: COMPLETED | OUTPATIENT
Start: 2019-02-05 | End: 2019-02-05

## 2019-02-05 RX ADMIN — LORAZEPAM 1 MG: 2 INJECTION INTRAMUSCULAR; INTRAVENOUS at 23:00

## 2019-02-05 RX ADMIN — HYDRALAZINE HYDROCHLORIDE 10 MG: 20 INJECTION INTRAMUSCULAR; INTRAVENOUS at 23:53

## 2019-02-05 RX ADMIN — ASPIRIN 81 MG 324 MG: 81 TABLET ORAL at 23:15

## 2019-02-05 RX ADMIN — SODIUM CHLORIDE, POTASSIUM CHLORIDE, SODIUM LACTATE AND CALCIUM CHLORIDE 1000 ML: 600; 310; 30; 20 INJECTION, SOLUTION INTRAVENOUS at 23:53

## 2019-02-06 PROBLEM — R79.89 TROPONIN I ABOVE REFERENCE RANGE: Status: ACTIVE | Noted: 2019-02-06

## 2019-02-06 PROBLEM — F10.939 ALCOHOL WITHDRAWAL (HCC): Status: ACTIVE | Noted: 2019-02-06

## 2019-02-06 LAB
AMPHET UR QL SCN: NEGATIVE
BARBITURATES UR QL SCN: NEGATIVE
BENZODIAZ UR QL SCN: NEGATIVE
BZE UR QL SCN: NEGATIVE
CANNABINOIDS UR QL SCN: NEGATIVE
EKG IMPRESSION: NORMAL
EST. AVERAGE GLUCOSE BLD GHB EST-MCNC: 103 MG/DL
ETHANOL BLD-MCNC: 0.01 G/DL
HBA1C MFR BLD: 5.2 % (ref 0–5.6)
MAGNESIUM SERPL-MCNC: 1.6 MG/DL (ref 1.5–2.5)
MAGNESIUM SERPL-MCNC: 1.8 MG/DL (ref 1.5–2.5)
METHADONE UR QL SCN: NEGATIVE
OPIATES UR QL SCN: NEGATIVE
OXYCODONE UR QL SCN: NEGATIVE
PCP UR QL SCN: NEGATIVE
PHOSPHATE SERPL-MCNC: 3.4 MG/DL (ref 2.5–4.5)
PROPOXYPH UR QL SCN: NEGATIVE
TROPONIN I SERPL-MCNC: 0.04 NG/ML (ref 0–0.04)
TROPONIN I SERPL-MCNC: 0.04 NG/ML (ref 0–0.04)
TROPONIN I SERPL-MCNC: 0.06 NG/ML (ref 0–0.04)

## 2019-02-06 PROCEDURE — 700105 HCHG RX REV CODE 258: Performed by: HOSPITALIST

## 2019-02-06 PROCEDURE — 700102 HCHG RX REV CODE 250 W/ 637 OVERRIDE(OP): Performed by: HOSPITALIST

## 2019-02-06 PROCEDURE — A9270 NON-COVERED ITEM OR SERVICE: HCPCS | Performed by: HOSPITALIST

## 2019-02-06 PROCEDURE — 96376 TX/PRO/DX INJ SAME DRUG ADON: CPT

## 2019-02-06 PROCEDURE — 93010 ELECTROCARDIOGRAM REPORT: CPT | Performed by: INTERNAL MEDICINE

## 2019-02-06 PROCEDURE — 99223 1ST HOSP IP/OBS HIGH 75: CPT | Performed by: HOSPITALIST

## 2019-02-06 PROCEDURE — 80307 DRUG TEST PRSMV CHEM ANLYZR: CPT

## 2019-02-06 PROCEDURE — 36415 COLL VENOUS BLD VENIPUNCTURE: CPT

## 2019-02-06 PROCEDURE — 84484 ASSAY OF TROPONIN QUANT: CPT

## 2019-02-06 PROCEDURE — 700111 HCHG RX REV CODE 636 W/ 250 OVERRIDE (IP): Performed by: HOSPITALIST

## 2019-02-06 PROCEDURE — 770020 HCHG ROOM/CARE - TELE (206)

## 2019-02-06 PROCEDURE — HZ2ZZZZ DETOXIFICATION SERVICES FOR SUBSTANCE ABUSE TREATMENT: ICD-10-PCS | Performed by: HOSPITALIST

## 2019-02-06 PROCEDURE — 83735 ASSAY OF MAGNESIUM: CPT

## 2019-02-06 PROCEDURE — 93005 ELECTROCARDIOGRAM TRACING: CPT | Performed by: EMERGENCY MEDICINE

## 2019-02-06 PROCEDURE — 93005 ELECTROCARDIOGRAM TRACING: CPT | Performed by: HOSPITALIST

## 2019-02-06 RX ORDER — THIAMINE MONONITRATE (VIT B1) 100 MG
100 TABLET ORAL DAILY
Status: DISCONTINUED | OUTPATIENT
Start: 2019-02-06 | End: 2019-02-07 | Stop reason: HOSPADM

## 2019-02-06 RX ORDER — LORAZEPAM 2 MG/ML
2 INJECTION INTRAMUSCULAR
Status: DISCONTINUED | OUTPATIENT
Start: 2019-02-06 | End: 2019-02-07 | Stop reason: HOSPADM

## 2019-02-06 RX ORDER — HYDRALAZINE HYDROCHLORIDE 20 MG/ML
10 INJECTION INTRAMUSCULAR; INTRAVENOUS EVERY 4 HOURS PRN
Status: DISCONTINUED | OUTPATIENT
Start: 2019-02-06 | End: 2019-02-07 | Stop reason: HOSPADM

## 2019-02-06 RX ORDER — LORAZEPAM 2 MG/ML
1.5 INJECTION INTRAMUSCULAR
Status: DISCONTINUED | OUTPATIENT
Start: 2019-02-06 | End: 2019-02-07 | Stop reason: HOSPADM

## 2019-02-06 RX ORDER — POTASSIUM CHLORIDE 20 MEQ/1
40 TABLET, EXTENDED RELEASE ORAL ONCE
Status: COMPLETED | OUTPATIENT
Start: 2019-02-06 | End: 2019-02-06

## 2019-02-06 RX ORDER — ONDANSETRON 2 MG/ML
4 INJECTION INTRAMUSCULAR; INTRAVENOUS EVERY 4 HOURS PRN
Status: DISCONTINUED | OUTPATIENT
Start: 2019-02-06 | End: 2019-02-07 | Stop reason: HOSPADM

## 2019-02-06 RX ORDER — LORAZEPAM 1 MG/1
3 TABLET ORAL
Status: DISCONTINUED | OUTPATIENT
Start: 2019-02-06 | End: 2019-02-07 | Stop reason: HOSPADM

## 2019-02-06 RX ORDER — HYDRALAZINE HYDROCHLORIDE 20 MG/ML
20 INJECTION INTRAMUSCULAR; INTRAVENOUS ONCE
Status: ACTIVE | OUTPATIENT
Start: 2019-02-06 | End: 2019-02-07

## 2019-02-06 RX ORDER — ONDANSETRON 4 MG/1
4 TABLET, ORALLY DISINTEGRATING ORAL EVERY 4 HOURS PRN
Status: DISCONTINUED | OUTPATIENT
Start: 2019-02-06 | End: 2019-02-07 | Stop reason: HOSPADM

## 2019-02-06 RX ORDER — AMLODIPINE BESYLATE 5 MG/1
2.5 TABLET ORAL
Status: DISCONTINUED | OUTPATIENT
Start: 2019-02-06 | End: 2019-02-06

## 2019-02-06 RX ORDER — PROMETHAZINE HYDROCHLORIDE 25 MG/1
12.5-25 SUPPOSITORY RECTAL EVERY 4 HOURS PRN
Status: DISCONTINUED | OUTPATIENT
Start: 2019-02-06 | End: 2019-02-07 | Stop reason: HOSPADM

## 2019-02-06 RX ORDER — LORAZEPAM 2 MG/ML
0.5 INJECTION INTRAMUSCULAR EVERY 4 HOURS PRN
Status: DISCONTINUED | OUTPATIENT
Start: 2019-02-06 | End: 2019-02-07 | Stop reason: HOSPADM

## 2019-02-06 RX ORDER — ENALAPRILAT 1.25 MG/ML
1.25 INJECTION INTRAVENOUS EVERY 6 HOURS PRN
Status: DISCONTINUED | OUTPATIENT
Start: 2019-02-06 | End: 2019-02-07 | Stop reason: HOSPADM

## 2019-02-06 RX ORDER — LORAZEPAM 0.5 MG/1
0.5 TABLET ORAL EVERY 4 HOURS PRN
Status: DISCONTINUED | OUTPATIENT
Start: 2019-02-06 | End: 2019-02-07 | Stop reason: HOSPADM

## 2019-02-06 RX ORDER — LORAZEPAM 1 MG/1
2 TABLET ORAL
Status: DISCONTINUED | OUTPATIENT
Start: 2019-02-06 | End: 2019-02-07 | Stop reason: HOSPADM

## 2019-02-06 RX ORDER — AMLODIPINE BESYLATE 5 MG/1
2.5 TABLET ORAL 2 TIMES DAILY
Status: DISCONTINUED | OUTPATIENT
Start: 2019-02-06 | End: 2019-02-07

## 2019-02-06 RX ORDER — LORAZEPAM 1 MG/1
1 TABLET ORAL EVERY 4 HOURS PRN
Status: DISCONTINUED | OUTPATIENT
Start: 2019-02-06 | End: 2019-02-07 | Stop reason: HOSPADM

## 2019-02-06 RX ORDER — POLYETHYLENE GLYCOL 3350 17 G/17G
1 POWDER, FOR SOLUTION ORAL
Status: DISCONTINUED | OUTPATIENT
Start: 2019-02-06 | End: 2019-02-07 | Stop reason: HOSPADM

## 2019-02-06 RX ORDER — ASPIRIN 81 MG/1
324 TABLET, CHEWABLE ORAL ONCE
Status: ACTIVE | OUTPATIENT
Start: 2019-02-06 | End: 2019-02-07

## 2019-02-06 RX ORDER — AMOXICILLIN 250 MG
2 CAPSULE ORAL 2 TIMES DAILY
Status: DISCONTINUED | OUTPATIENT
Start: 2019-02-06 | End: 2019-02-07 | Stop reason: HOSPADM

## 2019-02-06 RX ORDER — SODIUM CHLORIDE 9 MG/ML
INJECTION, SOLUTION INTRAVENOUS CONTINUOUS
Status: DISCONTINUED | OUTPATIENT
Start: 2019-02-06 | End: 2019-02-06

## 2019-02-06 RX ORDER — FOLIC ACID 1 MG/1
1 TABLET ORAL DAILY
Status: DISCONTINUED | OUTPATIENT
Start: 2019-02-06 | End: 2019-02-07 | Stop reason: HOSPADM

## 2019-02-06 RX ORDER — CLONIDINE HYDROCHLORIDE 0.1 MG/1
0.1 TABLET ORAL EVERY 6 HOURS PRN
Status: DISCONTINUED | OUTPATIENT
Start: 2019-02-06 | End: 2019-02-07 | Stop reason: HOSPADM

## 2019-02-06 RX ORDER — ACETAMINOPHEN 325 MG/1
650 TABLET ORAL EVERY 6 HOURS PRN
Status: DISCONTINUED | OUTPATIENT
Start: 2019-02-06 | End: 2019-02-07 | Stop reason: HOSPADM

## 2019-02-06 RX ORDER — MAGNESIUM SULFATE HEPTAHYDRATE 40 MG/ML
4 INJECTION, SOLUTION INTRAVENOUS ONCE
Status: COMPLETED | OUTPATIENT
Start: 2019-02-06 | End: 2019-02-06

## 2019-02-06 RX ORDER — PROMETHAZINE HYDROCHLORIDE 25 MG/1
12.5-25 TABLET ORAL EVERY 4 HOURS PRN
Status: DISCONTINUED | OUTPATIENT
Start: 2019-02-06 | End: 2019-02-07 | Stop reason: HOSPADM

## 2019-02-06 RX ORDER — LORAZEPAM 1 MG/1
4 TABLET ORAL
Status: DISCONTINUED | OUTPATIENT
Start: 2019-02-06 | End: 2019-02-07 | Stop reason: HOSPADM

## 2019-02-06 RX ORDER — LORAZEPAM 2 MG/ML
1 INJECTION INTRAMUSCULAR
Status: DISCONTINUED | OUTPATIENT
Start: 2019-02-06 | End: 2019-02-07 | Stop reason: HOSPADM

## 2019-02-06 RX ORDER — BISACODYL 10 MG
10 SUPPOSITORY, RECTAL RECTAL
Status: DISCONTINUED | OUTPATIENT
Start: 2019-02-06 | End: 2019-02-07 | Stop reason: HOSPADM

## 2019-02-06 RX ADMIN — THERA TABS 1 TABLET: TAB at 04:19

## 2019-02-06 RX ADMIN — SODIUM CHLORIDE 1000 ML: 9 INJECTION, SOLUTION INTRAVENOUS at 03:05

## 2019-02-06 RX ADMIN — ENALAPRILAT 1.25 MG: 2.5 INJECTION INTRAVENOUS at 09:14

## 2019-02-06 RX ADMIN — MAGNESIUM SULFATE IN WATER 4 G: 40 INJECTION, SOLUTION INTRAVENOUS at 09:09

## 2019-02-06 RX ADMIN — POTASSIUM CHLORIDE 40 MEQ: 1500 TABLET, EXTENDED RELEASE ORAL at 08:09

## 2019-02-06 RX ADMIN — CLONIDINE HYDROCHLORIDE 0.1 MG: 0.1 TABLET ORAL at 04:20

## 2019-02-06 RX ADMIN — HYDRALAZINE HYDROCHLORIDE 10 MG: 20 INJECTION INTRAMUSCULAR; INTRAVENOUS at 03:59

## 2019-02-06 RX ADMIN — AMLODIPINE BESYLATE 2.5 MG: 5 TABLET ORAL at 17:31

## 2019-02-06 RX ADMIN — CLONIDINE HYDROCHLORIDE 0.1 MG: 0.1 TABLET ORAL at 20:11

## 2019-02-06 RX ADMIN — FOLIC ACID 1 MG: 1 TABLET ORAL at 04:20

## 2019-02-06 RX ADMIN — LORAZEPAM 0.5 MG: 0.5 TABLET ORAL at 03:04

## 2019-02-06 RX ADMIN — HYDRALAZINE HYDROCHLORIDE 10 MG: 20 INJECTION INTRAMUSCULAR; INTRAVENOUS at 08:09

## 2019-02-06 RX ADMIN — AMLODIPINE BESYLATE 2.5 MG: 5 TABLET ORAL at 10:50

## 2019-02-06 RX ADMIN — CLONIDINE HYDROCHLORIDE 0.1 MG: 0.1 TABLET ORAL at 13:10

## 2019-02-06 RX ADMIN — Medication 100 MG: at 04:19

## 2019-02-06 RX ADMIN — CLONIDINE HYDROCHLORIDE 0.1 MG: 0.1 TABLET ORAL at 03:04

## 2019-02-06 RX ADMIN — ENALAPRILAT 1.25 MG: 2.5 INJECTION INTRAVENOUS at 17:31

## 2019-02-06 ASSESSMENT — LIFESTYLE VARIABLES
VISUAL DISTURBANCES: NOT PRESENT
ANXIETY: *
AGITATION: SOMEWHAT MORE THAN NORMAL ACTIVITY
HEADACHE, FULLNESS IN HEAD: NOT PRESENT
PAROXYSMAL SWEATS: *
AGITATION: SOMEWHAT MORE THAN NORMAL ACTIVITY
PAROXYSMAL SWEATS: NO SWEAT VISIBLE
TOTAL SCORE: 4
DOES PATIENT WANT TO STOP DRINKING: YES
TREMOR: *
AUDITORY DISTURBANCES: NOT PRESENT
ORIENTATION AND CLOUDING OF SENSORIUM: ORIENTED AND CAN DO SERIAL ADDITIONS
VISUAL DISTURBANCES: NOT PRESENT
AVERAGE NUMBER OF DAYS PER WEEK YOU HAVE A DRINK CONTAINING ALCOHOL: 7
TREMOR: TREMOR NOT VISIBLE BUT CAN BE FELT, FINGERTIP TO FINGERTIP
ON A TYPICAL DAY WHEN YOU DRINK ALCOHOL HOW MANY DRINKS DO YOU HAVE: 5
AGITATION: NORMAL ACTIVITY
TREMOR: TREMOR NOT VISIBLE BUT CAN BE FELT, FINGERTIP TO FINGERTIP
EVER_SMOKED: YES
AUDITORY DISTURBANCES: NOT PRESENT
HEADACHE, FULLNESS IN HEAD: NOT PRESENT
ORIENTATION AND CLOUDING OF SENSORIUM: ORIENTED AND CAN DO SERIAL ADDITIONS
AUDITORY DISTURBANCES: NOT PRESENT
NAUSEA AND VOMITING: NO NAUSEA AND NO VOMITING
HAVE YOU EVER FELT YOU SHOULD CUT DOWN ON YOUR DRINKING: YES
HEADACHE, FULLNESS IN HEAD: NOT PRESENT
PAROXYSMAL SWEATS: *
HOW MANY TIMES IN THE PAST YEAR HAVE YOU HAD 5 OR MORE DRINKS IN A DAY: 5
AUDITORY DISTURBANCES: NOT PRESENT
TOTAL SCORE: 1
ANXIETY: NO ANXIETY (AT EASE)
NAUSEA AND VOMITING: NO NAUSEA AND NO VOMITING
EVER FELT BAD OR GUILTY ABOUT YOUR DRINKING: YES
NAUSEA AND VOMITING: NO NAUSEA AND NO VOMITING
ANXIETY: NO ANXIETY (AT EASE)
HAVE PEOPLE ANNOYED YOU BY CRITICIZING YOUR DRINKING: YES
PAROXYSMAL SWEATS: NO SWEAT VISIBLE
NAUSEA AND VOMITING: NO NAUSEA AND NO VOMITING
HEADACHE, FULLNESS IN HEAD: NOT PRESENT
ORIENTATION AND CLOUDING OF SENSORIUM: ORIENTED AND CAN DO SERIAL ADDITIONS
EVER HAD A DRINK FIRST THING IN THE MORNING TO STEADY YOUR NERVES TO GET RID OF A HANGOVER: YES
AGITATION: NORMAL ACTIVITY
NAUSEA AND VOMITING: NO NAUSEA AND NO VOMITING
DOES PATIENT WANT TO TALK TO SOMEONE ABOUT QUITTING: NO
ANXIETY: *
ORIENTATION AND CLOUDING OF SENSORIUM: ORIENTED AND CAN DO SERIAL ADDITIONS
AGITATION: NORMAL ACTIVITY
NAUSEA AND VOMITING: NO NAUSEA AND NO VOMITING
TREMOR: TREMOR NOT VISIBLE BUT CAN BE FELT, FINGERTIP TO FINGERTIP
VISUAL DISTURBANCES: NOT PRESENT
TOTAL SCORE: 1
CONSUMPTION TOTAL: POSITIVE
PAROXYSMAL SWEATS: NO SWEAT VISIBLE
ORIENTATION AND CLOUDING OF SENSORIUM: ORIENTED AND CAN DO SERIAL ADDITIONS
PAROXYSMAL SWEATS: NO SWEAT VISIBLE
VISUAL DISTURBANCES: NOT PRESENT
TREMOR: TREMOR NOT VISIBLE BUT CAN BE FELT, FINGERTIP TO FINGERTIP
ANXIETY: NO ANXIETY (AT EASE)
ANXIETY: NO ANXIETY (AT EASE)
AUDITORY DISTURBANCES: NOT PRESENT
TOTAL SCORE: 6
TOTAL SCORE: 4
NAUSEA AND VOMITING: NO NAUSEA AND NO VOMITING
TOTAL SCORE: 6
VISUAL DISTURBANCES: NOT PRESENT
PAROXYSMAL SWEATS: BARELY PERCEPTIBLE SWEATING. PALMS MOIST
VISUAL DISTURBANCES: NOT PRESENT
AUDITORY DISTURBANCES: NOT PRESENT
ALCOHOL_USE: YES
TOTAL SCORE: 3
TOTAL SCORE: 4
HEADACHE, FULLNESS IN HEAD: NOT PRESENT
ORIENTATION AND CLOUDING OF SENSORIUM: ORIENTED AND CAN DO SERIAL ADDITIONS
VISUAL DISTURBANCES: NOT PRESENT
TOTAL SCORE: 3
ORIENTATION AND CLOUDING OF SENSORIUM: ORIENTED AND CAN DO SERIAL ADDITIONS
HEADACHE, FULLNESS IN HEAD: NOT PRESENT
AUDITORY DISTURBANCES: NOT PRESENT
TREMOR: TREMOR NOT VISIBLE BUT CAN BE FELT, FINGERTIP TO FINGERTIP
HEADACHE, FULLNESS IN HEAD: NOT PRESENT
TREMOR: TREMOR NOT VISIBLE BUT CAN BE FELT, FINGERTIP TO FINGERTIP
ANXIETY: NO ANXIETY (AT EASE)
TOTAL SCORE: 1

## 2019-02-06 ASSESSMENT — ENCOUNTER SYMPTOMS
EYES NEGATIVE: 1
CARDIOVASCULAR NEGATIVE: 1
NAUSEA: 1
RESPIRATORY NEGATIVE: 1
CONSTITUTIONAL NEGATIVE: 1
NERVOUS/ANXIOUS: 1
DIZZINESS: 1

## 2019-02-06 ASSESSMENT — COGNITIVE AND FUNCTIONAL STATUS - GENERAL
MOBILITY SCORE: 24
SUGGESTED CMS G CODE MODIFIER MOBILITY: CH
DAILY ACTIVITIY SCORE: 24
SUGGESTED CMS G CODE MODIFIER DAILY ACTIVITY: CH

## 2019-02-06 ASSESSMENT — PATIENT HEALTH QUESTIONNAIRE - PHQ9
2. FEELING DOWN, DEPRESSED, IRRITABLE, OR HOPELESS: NOT AT ALL
1. LITTLE INTEREST OR PLEASURE IN DOING THINGS: NOT AT ALL
SUM OF ALL RESPONSES TO PHQ9 QUESTIONS 1 AND 2: 0

## 2019-02-06 ASSESSMENT — COPD QUESTIONNAIRES
DURING THE PAST 4 WEEKS HOW MUCH DID YOU FEEL SHORT OF BREATH: NONE/LITTLE OF THE TIME
IN THE PAST 12 MONTHS DO YOU DO LESS THAN YOU USED TO BECAUSE OF YOUR BREATHING PROBLEMS: DISAGREE/UNSURE
HAVE YOU SMOKED AT LEAST 100 CIGARETTES IN YOUR ENTIRE LIFE: NO/DON'T KNOW
DO YOU EVER COUGH UP ANY MUCUS OR PHLEGM?: NO/ONLY WITH OCCASIONAL COLDS OR INFECTIONS
COPD SCREENING SCORE: 0

## 2019-02-06 NOTE — H&P
Hospital Medicine History & Physical Note    Date of Service  2/6/2019    Primary Care Physician  Pcp Pt States None    Consultants  None    Code Status  Full code    Chief Complaint  Anxious    History of Presenting Illness  38 y.o. male with prior history of hypertension for which she is not currently taking medications, daily alcohol intake without prior withdrawal, was in his usual state of health until 2 days prior to admission.  He reports drinking quite a lot more than usual for the Super Bowl.  This continued on until the day prior to admission as well.  On the day of admission he presented to work as usual, however upon arrival, he became somewhat anxious, with headache, lightheadedness, and very elevated blood pressure in the systolic greater than 200s.  He attempted to relax with no cessation of the symptoms, and then presented for evaluation.  He currently reports some lightheadedness, no headache or vision changes, no chest pain or shortness of breath, no abdominal pain, he does feel somewhat nauseous without vomiting, no diarrhea or constipation.  He feels very anxious.  He reports fluctuating blood pressure.    Review of Systems  Review of Systems   Constitutional: Negative.    HENT: Negative.    Eyes: Negative.    Respiratory: Negative.    Cardiovascular: Negative.    Gastrointestinal: Positive for nausea.   Genitourinary: Negative.    Neurological: Positive for dizziness.   Endo/Heme/Allergies: Negative.    Psychiatric/Behavioral: The patient is nervous/anxious.        Past Medical History   has a past medical history of BMI 38.0-38.9,adult (8/9/2012); Coccyx pain; and Hypertension. He also has no past medical history of Anemia; GERD (gastroesophageal reflux disease); Heart murmur; Ulcer; or Urinary tract infection, site not specified.    Surgical History   has a past surgical history that includes tonsillectomy; cyst excision; and mass excision general (2/10/2012).     Family History  family  history includes Cancer in his maternal grandfather; Hypertension in his father; No Known Problems in his mother.     Social History   reports that he has been smoking Cigarettes.  He has never used smokeless tobacco. He reports that he drinks about 3.0 - 4.0 oz of alcohol per week . He reports that he does not use drugs.    Allergies  No Known Allergies    Medications  None       Physical Exam  Temp:  [36.6 °C (97.9 °F)-36.7 °C (98.1 °F)] 36.6 °C (97.9 °F)  Pulse:  [108-125] 112  Resp:  [16-29] 18  BP: (168-217)/() 201/129  SpO2:  [93 %-99 %] 94 %    Physical Exam   Constitutional: He is oriented to person, place, and time. He appears well-developed and well-nourished. No distress.   HENT:   Head: Normocephalic and atraumatic.   Eyes: Pupils are equal, round, and reactive to light. Conjunctivae are normal.   Neck: Normal range of motion. Neck supple. No tracheal deviation present. No thyromegaly present.   Cardiovascular: Normal rate, regular rhythm and normal heart sounds.  Exam reveals no gallop and no friction rub.    No murmur heard.  Pulmonary/Chest: Effort normal and breath sounds normal. No respiratory distress. He has no wheezes.   Abdominal: Soft. Bowel sounds are normal. He exhibits no distension and no mass. There is no tenderness. There is no rebound and no guarding.   Musculoskeletal: Normal range of motion. He exhibits no edema.   Lymphadenopathy:     He has no cervical adenopathy.   Neurological: He is alert and oriented to person, place, and time. No cranial nerve deficit.   Skin: Skin is warm and dry. He is not diaphoretic.   Psychiatric: He has a normal mood and affect.   Nursing note and vitals reviewed.      Laboratory:  Recent Labs      02/05/19   2250   WBC  13.6*   RBC  5.96   HEMOGLOBIN  18.1*   HEMATOCRIT  53.6*   MCV  89.9   MCH  30.4   MCHC  33.8   RDW  44.8   PLATELETCT  327   MPV  9.9     Recent Labs      02/05/19   2250   SODIUM  140   POTASSIUM  3.4*   CHLORIDE  102   CO2  23    GLUCOSE  107*   BUN  16   CREATININE  1.03   CALCIUM  9.4     Recent Labs      02/05/19   2250   GLUCOSE  107*     Recent Labs      02/05/19   2250   APTT  29.9   INR  1.00             Recent Labs      02/05/19   2250   TROPONINI  0.06*       Urinalysis:    No results found     Imaging:  DX-CHEST-PORTABLE (1 VIEW)   Final Result         1.  No acute cardiopulmonary disease.            Assessment/Plan:  I anticipate this patient is appropriate for observation status at this time.    * Alcohol withdrawal (HCC)   Assessment & Plan    With tachycardia and accelerated hypertension, in the setting of binge drinking, and then cessation.  We will monitor on the CIWA protocol.  Ativan as needed     Troponin I above reference range   Assessment & Plan    Indeterminant elevation, likely demand from tachycardia and malignant hypertension in the setting of alcohol withdrawal.  We will trend troponin     Essential hypertension- (present on admission)   Assessment & Plan    Not currently medicated at home.  We will monitor.  Patient may ultimately need better blood pressure management as an outpatient.  Will need to evaluate after withdrawal symptoms have been alleviated     Class 3 severe obesity due to excess calories without serious comorbidity with body mass index (BMI) of 45.0 to 49.9 in adult (HCC)- (present on admission)   Assessment & Plan    Body mass index is 45.6 kg/m².           VTE prophylaxis: SCD, lovenox

## 2019-02-06 NOTE — ED TRIAGE NOTES
Chief Complaint   Patient presents with   • Hypertension       BP (!) 217/104   Pulse (!) 124   Temp 36.7 °C (98.1 °F) (Temporal)   Resp 18   Ht 1.829 m (6')   Wt (!) 149.7 kg (330 lb)   SpO2 98%   BMI 44.76 kg/m²     37 y/o male presents to ED with complaint of dizziness and sensation of feeling flushed.  These sxs prompted him to check his blood pressure, which he noted was high.  Patient reports vague hx of HTN, for which he was taking HCTZ.  States he has not taken this medication in quite some time. No CP, no N/V, no abdominal pain. Slightly anxious on exam.

## 2019-02-06 NOTE — ASSESSMENT & PLAN NOTE
Indeterminant elevation, likely demand from tachycardia and malignant hypertension in the setting of alcohol withdrawal.  We will trend troponin

## 2019-02-06 NOTE — PROGRESS NOTES
Received Bedside report. Assumed care at 0700. This pt is AOx4, ambulatory and upself, voiding appropriately, denies pain. Patient and RN discussed plan of care: questions answered. Labs noted, assessment complete, patient tolerating NPO diet for possible stress test. Tele box in place. Pt is on RA. Call light in place, fall precautions in place, patient educated on importance of calling for assistance. No additional needs at this time.

## 2019-02-06 NOTE — PROGRESS NOTES
Pt seen and exam stable  Bp elevated, prn ACE added and oral norvasc started  Replacing mag and k  pthal in am if trops remain low    See h/p from this am

## 2019-02-06 NOTE — ASSESSMENT & PLAN NOTE
With tachycardia and accelerated hypertension, in the setting of binge drinking, and then cessation.  We will monitor on the CIWA protocol.  Ativan as needed

## 2019-02-06 NOTE — PROGRESS NOTES
0242: MD Dr. Rocha paged.    0249: Call back received. MD updated on pt's blood pressure. MD also updated that pt does not want to have any ativan at this time. MD stated he would rather the pt get the ativan before PRN blood pressure medications, but if the pt does not want it, okay to give PRN blood pressure medications at this time.

## 2019-02-06 NOTE — DIETARY
NUTRITION SERVICES: BMI - Pt with BMI >40 (=Body mass index is 45.6 kg/m².). Weight loss counseling not appropriate in acute care setting. RECOMMEND - Referral to outpatient nutrition services for weight management after D/C.

## 2019-02-06 NOTE — PROGRESS NOTES
This RN scored the pt a 6 for the first initial CIWA score. The pt is declining any ativan at this time. Pt educated on medication importance on reducing anxiety. Pt continues to decline at this time.

## 2019-02-06 NOTE — ED PROVIDER NOTES
ED Provider Note    ED Provider Note      Primary care provider: Pcp Pt States None    CHIEF COMPLAINT  Chief Complaint   Patient presents with   • Hypertension       HPI  Katja Sparks is a 38 y.o. male who presents to the Emergency Department with chief complaint of hypertension/dizziness.  Patient was at work this evening stated that he turned suddenly and he got an abnormal dizzy sensation.  Did not think much of it however the dizziness persisted went to the bathroom felt as though he is very flush and continue to have abnormal dizzy sensation checked his blood pressure was grossly elevated.  Patient reports no headache no neck pain he denies chest pain or palpitations he has had no nausea no vomiting.  States that he previously had hypertension for which he took HCTZ for blood pressures have been improved recently and is not been taking this medication.  No vision changes no hearing changes has been otherwise well recently no fevers chills cough congestion no other acute symptoms or concerns.   denies heavy caffeine or any other ingestion.  Patient does report that he drank excessively 2 days prior at a party felt as though he could have still been recovering from that.        REVIEW OF SYSTEMS  10 systems reviewed and otherwise negative, pertinent positives and negatives listed in the history of present illness.    PAST MEDICAL HISTORY   has a past medical history of BMI 38.0-38.9,adult (8/9/2012) and Coccyx pain.    SURGICAL HISTORY   has a past surgical history that includes tonsillectomy; cyst excision; and mass excision general (2/10/2012).    SOCIAL HISTORY  Social History   Substance Use Topics   • Smoking status: Light Tobacco Smoker     Types: Cigarettes   • Smokeless tobacco: Never Used      Comment: pack a week for 4 yrs NONE anymore   • Alcohol use 3.0 - 4.0 oz/week     6 - 8 Standard drinks or equivalent per week      Comment: once a week      History   Drug Use No       FAMILY  HISTORY  Non-Contributory    CURRENT MEDICATIONS  None    ALLERGIES  No Known Allergies    PHYSICAL EXAM  VITAL SIGNS: Ht 1.829 m (6')   Wt (!) 149.7 kg (330 lb)   BMI 44.76 kg/m²   Pulse ox interpretation: I interpret this pulse ox as normal.  Constitutional: Alert and oriented x 3, moderate distress  HEENT: Atraumatic normocephalic, pupils are equal round reactive to light extraocular movements are intact. The nares is clear, external ears are normal, mouth shows dry mucous membranes  Neck: Supple, no JVD no tracheal deviation  Cardiovascular: Tachycardic, no murmur rub or gallop 2+ pulses peripherally x4  Thorax & Lungs: No respiratory distress, no wheezes rales or rhonchi, No chest tenderness.   GI: Soft nontender nondistended positive bowel sounds, no peritoneal signs  Skin: Warm, flush  Musculoskeletal: Moving all extremities with full range and 5 of 5 strength, no acute  deformity  Neurologic: Cranial nerves III through XII are grossly intact, no sensory deficit, no cerebellar dysfunction   Psychiatric: Appropriate affect for situation at this time      DIAGNOSTIC STUDIES / PROCEDURES  LABS      Results for orders placed or performed during the hospital encounter of 02/05/19   CBC WITH DIFFERENTIAL   Result Value Ref Range    WBC 13.6 (H) 4.8 - 10.8 K/uL    RBC 5.96 4.70 - 6.10 M/uL    Hemoglobin 18.1 (H) 14.0 - 18.0 g/dL    Hematocrit 53.6 (H) 42.0 - 52.0 %    MCV 89.9 81.4 - 97.8 fL    MCH 30.4 27.0 - 33.0 pg    MCHC 33.8 33.7 - 35.3 g/dL    RDW 44.8 35.9 - 50.0 fL    Platelet Count 327 164 - 446 K/uL    MPV 9.9 9.0 - 12.9 fL    Neutrophils-Polys 57.00 44.00 - 72.00 %    Lymphocytes 30.40 22.00 - 41.00 %    Monocytes 9.00 0.00 - 13.40 %    Eosinophils 1.60 0.00 - 6.90 %    Basophils 0.80 0.00 - 1.80 %    Immature Granulocytes 1.20 (H) 0.00 - 0.90 %    Nucleated RBC 0.00 /100 WBC    Neutrophils (Absolute) 7.76 (H) 1.82 - 7.42 K/uL    Lymphs (Absolute) 4.15 1.00 - 4.80 K/uL    Monos (Absolute) 1.22 (H) 0.00  - 0.85 K/uL    Eos (Absolute) 0.22 0.00 - 0.51 K/uL    Baso (Absolute) 0.11 0.00 - 0.12 K/uL    Immature Granulocytes (abs) 0.17 (H) 0.00 - 0.11 K/uL    NRBC (Absolute) 0.00 K/uL   BASIC METABOLIC PANEL   Result Value Ref Range    Sodium 140 135 - 145 mmol/L    Potassium 3.4 (L) 3.6 - 5.5 mmol/L    Chloride 102 96 - 112 mmol/L    Co2 23 20 - 33 mmol/L    Glucose 107 (H) 65 - 99 mg/dL    Bun 16 8 - 22 mg/dL    Creatinine 1.03 0.50 - 1.40 mg/dL    Calcium 9.4 8.5 - 10.5 mg/dL    Anion Gap 15.0 (H) 0.0 - 11.9   TROPONIN   Result Value Ref Range    Troponin I 0.06 (H) 0.00 - 0.04 ng/mL   PT/INR   Result Value Ref Range    PT 13.3 12.0 - 14.6 sec    INR 1.00 0.87 - 1.13   PTT   Result Value Ref Range    APTT 29.9 24.7 - 36.0 sec   ESTIMATED GFR   Result Value Ref Range    GFR If African American >60 >60 mL/min/1.73 m 2    GFR If Non African American >60 >60 mL/min/1.73 m 2   Magnesium   Result Value Ref Range    Magnesium 1.8 1.5 - 2.5 mg/dL   Phosphorus: AM Daily x 3 days   Result Value Ref Range    Phosphorus 3.4 2.5 - 4.5 mg/dL   Magnesium: AM Daily x 3 days   Result Value Ref Range    Magnesium 1.6 1.5 - 2.5 mg/dL   EKG (NOW)   Result Value Ref Range    Report       [facility] Emergency Dept.    Test Date:  2019  Pt Name:    GAYLE DUFFY           Department: ER  MRN:        [id number]                      Room:       RD 10  Gender:     Male                         Technician: [id number]  :        1980                   Requested By:ER TRIAGE PROTOCOL  Order #:    [id number]                    Linda MD: INÉS AMBROSIO MD    Measurements  Intervals                                Axis  Rate:       122                          P:          62  NC:         164                          QRS:        32  QRSD:       100                          T:          42  QT:         324  QTc:        462    Interpretive Statements  Sinus tachycardia rate of 122 isolated minimal ST elevation in V1  only no  other  acute changes.  Electronically Signed On 2019 23:02:16 PST by INÉS AMBROSIO MD     EKG   Result Value Ref Range    Report       [facility] Emergency Dept.    Test Date:  2019  Pt Name:    GAYLE [facility]           Department: ER  MRN:        [id number]                      Room:       RD 10  Gender:     Male                         Technician: [id number]  :        1980                   Requested By:INÉS AMBROSIO  Order #:    [id number]                    Reading MD:    Measurements  Intervals                                Axis  Rate:       121                          P:          69  KS:         168                          QRS:        53  QRSD:       94                           T:          32  QT:         328  QTc:        466    Interpretive Statements  SINUS TACHYCARDIA  Compared to ECG 2019 22:45:30  ST (T wave) deviation no longer present     EKG: Every morning x 3 days   Result Value Ref Range    Report       [facility]    Test Date:  2019  Pt Name:    GAYLE [facility]           Department: ER  MRN:        [id number]                      Room:       T711  Gender:     Male                         Technician: [id number]  :        1980                   Requested By:JUAN HEATH  Order #:    [id number]                    Reading MD:    Measurements  Intervals                                Axis  Rate:       111                          P:          62  KS:         172                          QRS:        41  QRSD:       96                           T:          34  QT:         356  QTc:        484    Interpretive Statements  SINUS TACHYCARDIA  BORDERLINE PROLONGED QT INTERVAL  Compared to ECG 2019 00:27:35  No significant changes         All labs reviewed by me.      RADIOLOGY  DX-CHEST-PORTABLE (1 VIEW)   Final Result         1.  No acute cardiopulmonary disease.        The radiologist's interpretation of all radiological studies  have been reviewed by me.    COURSE & MEDICAL DECISION MAKING  Pertinent Labs & Imaging studies reviewed. (See chart for details)    10:50 PM - Patient seen and examined at bedside.          Patient noted to have slightly elevated blood pressure likely circumstantial secondary to presenting complaint. Referred to primary care physician for further evaluation.     Patient was given IV fluids based on tachycardia dry mucous membranes anion gap acidosis, oral hydration was not attempted due to inability to tolerate p.o. and insufficiency for hydration, after fluids had improvement of tachycardia and improvement of symptoms.    Medical Decision Making: Pleasant 38-year-old male presents with some profound hypertension.  Is been tachycardic persistently hypertensive here.  There was slight concern for alcohol withdrawal patient very little response to 1 mg of Ativan.  Very slight elevation of troponin 0.06.  At this point patient be admitted to the hospital service for further evaluation and treatment I discussed case with Dr. Rocha his help with this patient's greatly appreciated admitted in guarded condition.    BP (!) 180/111   Pulse (!) 112   Temp 36.6 °C (97.9 °F) (Tympanic)   Resp 18   Ht 1.829 m (6')   Wt (!) 152.5 kg (336 lb 3.2 oz)   SpO2 94%   BMI 45.60 kg/m²         FINAL IMPRESSION   1.  Dizziness  2.  Hypertensive emergency      This dictation has been created using voice recognition software and/or scribes. The accuracy of the dictation is limited by the abilities of the software and the expertise of the scribes. I expect there may be some errors of grammar and possibly content. I made every attempt to manually correct the errors within my dictation. However, errors related to voice recognition software and/or scribes may still exist and should be interpreted within the appropriate context.

## 2019-02-06 NOTE — ED NOTES
Tele RN at bedside to transport patient upstairs.  Bedside report to Zoie JANG.  Patient A/Ox4, good color and NAD upon leaving department.

## 2019-02-06 NOTE — PROGRESS NOTES
2 RN Skin check not completed:    Pt is refusing skin check at this time. Pt's overall skin looks clean, dry, and intact.

## 2019-02-06 NOTE — ASSESSMENT & PLAN NOTE
Not currently medicated at home.  We will monitor.  Patient may ultimately need better blood pressure management as an outpatient.  Will need to evaluate after withdrawal symptoms have been alleviated

## 2019-02-06 NOTE — PROGRESS NOTES
Pt arrived to unit via 2 ACLS RNs and zoll monitor. Bedside report completed with ER nurse. Pt lying in bed comfortably. A/O x 4, pain 0/10. Safety precautions in place. Pt oriented to call light and room. Call light and personal belongings within reach. Pt educated on POC and all questions answered at this time.  Educated patient on calling for assistance when needed. All pt needs are met at this time.  Will continue to monitor.

## 2019-02-07 ENCOUNTER — PATIENT OUTREACH (OUTPATIENT)
Dept: HEALTH INFORMATION MANAGEMENT | Facility: OTHER | Age: 39
End: 2019-02-07

## 2019-02-07 ENCOUNTER — APPOINTMENT (OUTPATIENT)
Dept: RADIOLOGY | Facility: MEDICAL CENTER | Age: 39
DRG: 897 | End: 2019-02-07
Attending: HOSPITALIST
Payer: COMMERCIAL

## 2019-02-07 VITALS
TEMPERATURE: 99.1 F | RESPIRATION RATE: 16 BRPM | WEIGHT: 315 LBS | DIASTOLIC BLOOD PRESSURE: 102 MMHG | SYSTOLIC BLOOD PRESSURE: 150 MMHG | HEART RATE: 101 BPM | BODY MASS INDEX: 42.66 KG/M2 | HEIGHT: 72 IN | OXYGEN SATURATION: 95 %

## 2019-02-07 PROBLEM — F10.939 ALCOHOL WITHDRAWAL (HCC): Status: RESOLVED | Noted: 2019-02-06 | Resolved: 2019-02-07

## 2019-02-07 LAB
ANION GAP SERPL CALC-SCNC: 9 MMOL/L (ref 0–11.9)
BASOPHILS # BLD AUTO: 1 % (ref 0–1.8)
BASOPHILS # BLD: 0.08 K/UL (ref 0–0.12)
BUN SERPL-MCNC: 14 MG/DL (ref 8–22)
CALCIUM SERPL-MCNC: 9.1 MG/DL (ref 8.5–10.5)
CHLORIDE SERPL-SCNC: 104 MMOL/L (ref 96–112)
CO2 SERPL-SCNC: 22 MMOL/L (ref 20–33)
CREAT SERPL-MCNC: 0.81 MG/DL (ref 0.5–1.4)
EKG IMPRESSION: NORMAL
EOSINOPHIL # BLD AUTO: 0.18 K/UL (ref 0–0.51)
EOSINOPHIL NFR BLD: 2.2 % (ref 0–6.9)
ERYTHROCYTE [DISTWIDTH] IN BLOOD BY AUTOMATED COUNT: 46.8 FL (ref 35.9–50)
GLUCOSE SERPL-MCNC: 111 MG/DL (ref 65–99)
HCT VFR BLD AUTO: 54.5 % (ref 42–52)
HGB BLD-MCNC: 18.3 G/DL (ref 14–18)
IMM GRANULOCYTES # BLD AUTO: 0.06 K/UL (ref 0–0.11)
IMM GRANULOCYTES NFR BLD AUTO: 0.7 % (ref 0–0.9)
LYMPHOCYTES # BLD AUTO: 1.98 K/UL (ref 1–4.8)
LYMPHOCYTES NFR BLD: 23.8 % (ref 22–41)
MAGNESIUM SERPL-MCNC: 2.4 MG/DL (ref 1.5–2.5)
MCH RBC QN AUTO: 30.5 PG (ref 27–33)
MCHC RBC AUTO-ENTMCNC: 33.6 G/DL (ref 33.7–35.3)
MCV RBC AUTO: 90.8 FL (ref 81.4–97.8)
MONOCYTES # BLD AUTO: 0.66 K/UL (ref 0–0.85)
MONOCYTES NFR BLD AUTO: 7.9 % (ref 0–13.4)
NEUTROPHILS # BLD AUTO: 5.36 K/UL (ref 1.82–7.42)
NEUTROPHILS NFR BLD: 64.4 % (ref 44–72)
NRBC # BLD AUTO: 0 K/UL
NRBC BLD-RTO: 0 /100 WBC
PHOSPHATE SERPL-MCNC: 4 MG/DL (ref 2.5–4.5)
PLATELET # BLD AUTO: 257 K/UL (ref 164–446)
PMV BLD AUTO: 9.9 FL (ref 9–12.9)
POTASSIUM SERPL-SCNC: 4.4 MMOL/L (ref 3.6–5.5)
RBC # BLD AUTO: 6 M/UL (ref 4.7–6.1)
SODIUM SERPL-SCNC: 135 MMOL/L (ref 135–145)
TROPONIN I SERPL-MCNC: 0.02 NG/ML (ref 0–0.04)
TROPONIN I SERPL-MCNC: 0.05 NG/ML (ref 0–0.04)
WBC # BLD AUTO: 8.3 K/UL (ref 4.8–10.8)

## 2019-02-07 PROCEDURE — 700102 HCHG RX REV CODE 250 W/ 637 OVERRIDE(OP): Performed by: HOSPITALIST

## 2019-02-07 PROCEDURE — 85025 COMPLETE CBC W/AUTO DIFF WBC: CPT

## 2019-02-07 PROCEDURE — 93010 ELECTROCARDIOGRAM REPORT: CPT | Performed by: INTERNAL MEDICINE

## 2019-02-07 PROCEDURE — 700111 HCHG RX REV CODE 636 W/ 250 OVERRIDE (IP)

## 2019-02-07 PROCEDURE — A9270 NON-COVERED ITEM OR SERVICE: HCPCS | Performed by: HOSPITALIST

## 2019-02-07 PROCEDURE — A9502 TC99M TETROFOSMIN: HCPCS

## 2019-02-07 PROCEDURE — 36415 COLL VENOUS BLD VENIPUNCTURE: CPT

## 2019-02-07 PROCEDURE — 99239 HOSP IP/OBS DSCHRG MGMT >30: CPT | Performed by: HOSPITALIST

## 2019-02-07 PROCEDURE — 80048 BASIC METABOLIC PNL TOTAL CA: CPT

## 2019-02-07 PROCEDURE — 84484 ASSAY OF TROPONIN QUANT: CPT | Mod: 91

## 2019-02-07 PROCEDURE — 84100 ASSAY OF PHOSPHORUS: CPT

## 2019-02-07 PROCEDURE — 93005 ELECTROCARDIOGRAM TRACING: CPT | Performed by: HOSPITALIST

## 2019-02-07 PROCEDURE — 83735 ASSAY OF MAGNESIUM: CPT

## 2019-02-07 RX ORDER — AMLODIPINE BESYLATE 5 MG/1
5 TABLET ORAL DAILY
Qty: 30 TAB | Refills: 0 | Status: SHIPPED | OUTPATIENT
Start: 2019-02-08 | End: 2019-02-21 | Stop reason: SDUPTHER

## 2019-02-07 RX ORDER — LISINOPRIL 5 MG/1
2.5 TABLET ORAL
Status: DISCONTINUED | OUTPATIENT
Start: 2019-02-07 | End: 2019-02-07 | Stop reason: HOSPADM

## 2019-02-07 RX ORDER — AMLODIPINE BESYLATE 5 MG/1
5 TABLET ORAL
Status: DISCONTINUED | OUTPATIENT
Start: 2019-02-08 | End: 2019-02-07 | Stop reason: HOSPADM

## 2019-02-07 RX ORDER — LISINOPRIL 2.5 MG/1
2.5 TABLET ORAL DAILY
Qty: 30 TAB | Refills: 0 | Status: SHIPPED | OUTPATIENT
Start: 2019-02-08 | End: 2019-02-21

## 2019-02-07 RX ORDER — REGADENOSON 0.08 MG/ML
INJECTION, SOLUTION INTRAVENOUS
Status: COMPLETED
Start: 2019-02-07 | End: 2019-02-07

## 2019-02-07 RX ADMIN — FOLIC ACID 1 MG: 1 TABLET ORAL at 05:54

## 2019-02-07 RX ADMIN — CLONIDINE HYDROCHLORIDE 0.1 MG: 0.1 TABLET ORAL at 06:07

## 2019-02-07 RX ADMIN — LISINOPRIL 2.5 MG: 5 TABLET ORAL at 08:29

## 2019-02-07 RX ADMIN — REGADENOSON 0.4 MG: 0.08 INJECTION, SOLUTION INTRAVENOUS at 09:52

## 2019-02-07 RX ADMIN — Medication 100 MG: at 05:54

## 2019-02-07 RX ADMIN — THERA TABS 1 TABLET: TAB at 05:53

## 2019-02-07 RX ADMIN — AMLODIPINE BESYLATE 2.5 MG: 5 TABLET ORAL at 05:54

## 2019-02-07 ASSESSMENT — LIFESTYLE VARIABLES
PAROXYSMAL SWEATS: NO SWEAT VISIBLE
TREMOR: TREMOR NOT VISIBLE BUT CAN BE FELT, FINGERTIP TO FINGERTIP
PAROXYSMAL SWEATS: NO SWEAT VISIBLE
TOTAL SCORE: 1
TOTAL SCORE: 1
VISUAL DISTURBANCES: NOT PRESENT
HEADACHE, FULLNESS IN HEAD: NOT PRESENT
TREMOR: TREMOR NOT VISIBLE BUT CAN BE FELT, FINGERTIP TO FINGERTIP
ORIENTATION AND CLOUDING OF SENSORIUM: ORIENTED AND CAN DO SERIAL ADDITIONS
TOTAL SCORE: 1
AUDITORY DISTURBANCES: NOT PRESENT
NAUSEA AND VOMITING: NO NAUSEA AND NO VOMITING
ORIENTATION AND CLOUDING OF SENSORIUM: ORIENTED AND CAN DO SERIAL ADDITIONS
AUDITORY DISTURBANCES: NOT PRESENT
ANXIETY: NO ANXIETY (AT EASE)
AGITATION: NORMAL ACTIVITY
ORIENTATION AND CLOUDING OF SENSORIUM: ORIENTED AND CAN DO SERIAL ADDITIONS
AUDITORY DISTURBANCES: NOT PRESENT
ANXIETY: NO ANXIETY (AT EASE)
VISUAL DISTURBANCES: NOT PRESENT
ANXIETY: NO ANXIETY (AT EASE)
HEADACHE, FULLNESS IN HEAD: NOT PRESENT
HEADACHE, FULLNESS IN HEAD: NOT PRESENT
PAROXYSMAL SWEATS: NO SWEAT VISIBLE
AGITATION: NORMAL ACTIVITY
NAUSEA AND VOMITING: NO NAUSEA AND NO VOMITING
TREMOR: TREMOR NOT VISIBLE BUT CAN BE FELT, FINGERTIP TO FINGERTIP
VISUAL DISTURBANCES: NOT PRESENT
NAUSEA AND VOMITING: NO NAUSEA AND NO VOMITING
AGITATION: NORMAL ACTIVITY

## 2019-02-07 NOTE — DISCHARGE INSTRUCTIONS
Discharge Instructions    Discharged to home by car with self. Discharged via wheelchair, hospital escort: Yes.  Special equipment needed: Not Applicable    Be sure to schedule a follow-up appointment with your primary care doctor or any specialists as instructed.     Discharge Plan:   Smoking Cessation Offered: Patient Refused  Pneumococcal Vaccine Administered/Refused: Not given - Patient refused pneumococcal vaccine  Influenza Vaccine Indication: Not indicated: Previously immunized this influenza season and > 8 years of age    I understand that a diet low in cholesterol, fat, and sodium is recommended for good health. Unless I have been given specific instructions below for another diet, I accept this instruction as my diet prescription.   Other diet: cardiac, less than 2g sodium    Special Instructions: None    · Is patient discharged on Warfarin / Coumadin?   No     Depression / Suicide Risk    As you are discharged from this [facility] facility, it is important to learn how to keep safe from harming yourself.    Recognize the warning signs:  · Abrupt changes in personality, positive or negative- including increase in energy   · Giving away possessions  · Change in eating patterns- significant weight changes-  positive or negative  · Change in sleeping patterns- unable to sleep or sleeping all the time   · Unwillingness or inability to communicate  · Depression  · Unusual sadness, discouragement and loneliness  · Talk of wanting to die  · Neglect of personal appearance   · Rebelliousness- reckless behavior  · Withdrawal from people/activities they love  · Confusion- inability to concentrate     If you or a loved one observes any of these behaviors or has concerns about self-harm, here's what you can do:  · Talk about it- your feelings and reasons for harming yourself  · Remove any means that you might use to hurt yourself (examples: pills, rope, extension cords, firearm)  · Get professional help from the  community (Mental Health, Substance Abuse, psychological counseling)  · Do not be alone:Call your Safe Contact- someone whom you trust who will be there for you.  · Call your local CRISIS HOTLINE 824-8661 or 709-293-6511  · Call your local Children's Mobile Crisis Response Team [place] (365) 291-2061 or www.Updater  · Call the toll free National Suicide Prevention Hotlines   · National Suicide Prevention Lifeline 046-126-INDC (2904)  · National Hope Line Network 800-SUICIDE (645-3460)      Hypertension  Hypertension, commonly called high blood pressure, is when the force of blood pumping through your arteries is too strong. Your arteries are the blood vessels that carry blood from your heart throughout your body. A blood pressure reading consists of a higher number over a lower number, such as 110/72. The higher number (systolic) is the pressure inside your arteries when your heart pumps. The lower number (diastolic) is the pressure inside your arteries when your heart relaxes. Ideally you want your blood pressure below 120/80.  Hypertension forces your heart to work harder to pump blood. Your arteries may become narrow or stiff. Having untreated or uncontrolled hypertension can cause heart attack, stroke, kidney disease, and other problems.  What increases the risk?  Some risk factors for high blood pressure are controllable. Others are not.  Risk factors you cannot control include:  · Race. You may be at higher risk if you are .  · Age. Risk increases with age.  · Gender. Men are at higher risk than women before age 45 years. After age 65, women are at higher risk than men.  Risk factors you can control include:  · Not getting enough exercise or physical activity.  · Being overweight.  · Getting too much fat, sugar, calories, or salt in your diet.  · Drinking too much alcohol.  What are the signs or symptoms?  Hypertension does not usually cause signs or symptoms. Extremely high  blood pressure (hypertensive crisis) may cause headache, anxiety, shortness of breath, and nosebleed.  How is this diagnosed?  To check if you have hypertension, your health care provider will measure your blood pressure while you are seated, with your arm held at the level of your heart. It should be measured at least twice using the same arm. Certain conditions can cause a difference in blood pressure between your right and left arms. A blood pressure reading that is higher than normal on one occasion does not mean that you need treatment. If it is not clear whether you have high blood pressure, you may be asked to return on a different day to have your blood pressure checked again. Or, you may be asked to monitor your blood pressure at home for 1 or more weeks.  How is this treated?  Treating high blood pressure includes making lifestyle changes and possibly taking medicine. Living a healthy lifestyle can help lower high blood pressure. You may need to change some of your habits.  Lifestyle changes may include:  · Following the DASH diet. This diet is high in fruits, vegetables, and whole grains. It is low in salt, red meat, and added sugars.  · Keep your sodium intake below 2,300 mg per day.  · Getting at least 30-45 minutes of aerobic exercise at least 4 times per week.  · Losing weight if necessary.  · Not smoking.  · Limiting alcoholic beverages.  · Learning ways to reduce stress.  Your health care provider may prescribe medicine if lifestyle changes are not enough to get your blood pressure under control, and if one of the following is true:  · You are 18-59 years of age and your systolic blood pressure is above 140.  · You are 60 years of age or older, and your systolic blood pressure is above 150.  · Your diastolic blood pressure is above 90.  · You have diabetes, and your systolic blood pressure is over 140 or your diastolic blood pressure is over 90.  · You have kidney disease and your blood pressure is  above 140/90.  · You have heart disease and your blood pressure is above 140/90.  Your personal target blood pressure may vary depending on your medical conditions, your age, and other factors.  Follow these instructions at home:  · Have your blood pressure rechecked as directed by your health care provider.  · Take medicines only as directed by your health care provider. Follow the directions carefully. Blood pressure medicines must be taken as prescribed. The medicine does not work as well when you skip doses. Skipping doses also puts you at risk for problems.  · Do not smoke.  · Monitor your blood pressure at home as directed by your health care provider.  Contact a health care provider if:  · You think you are having a reaction to medicines taken.  · You have recurrent headaches or feel dizzy.  · You have swelling in your ankles.  · You have trouble with your vision.  Get help right away if:  · You develop a severe headache or confusion.  · You have unusual weakness, numbness, or feel faint.  · You have severe chest or abdominal pain.  · You vomit repeatedly.  · You have trouble breathing.  This information is not intended to replace advice given to you by your health care provider. Make sure you discuss any questions you have with your health care provider.  Document Released: 12/18/2006 Document Revised: 05/25/2017 Document Reviewed: 10/10/2014  [employer] Interactive Patient Education © 2017 [employer]    Amlodipine tablets  What is this medicine?  AMLODIPINE (am SERGIO di peen) is a calcium-channel blocker. It affects the amount of calcium found in your heart and muscle cells. This relaxes your blood vessels, which can reduce the amount of work the heart has to do. This medicine is used to lower high blood pressure. It is also used to prevent chest pain.  This medicine may be used for other purposes; ask your health care provider or pharmacist if you have questions.  COMMON BRAND NAME(S): Norvasc  What should  I tell my health care provider before I take this medicine?  They need to know if you have any of these conditions:  -heart problems like heart failure or aortic stenosis  -liver disease  -an unusual or allergic reaction to amlodipine, other medicines, foods, dyes, or preservatives  -pregnant or trying to get pregnant  -breast-feeding  How should I use this medicine?  Take this medicine by mouth with a glass of water. Follow the directions on the prescription label. Take your medicine at regular intervals. Do not take more medicine than directed.  Talk to your pediatrician regarding the use of this medicine in children. Special care may be needed. This medicine has been used in children as young as 6.  Persons over 65 years old may have a stronger reaction to this medicine and need smaller doses.  Overdosage: If you think you have taken too much of this medicine contact a poison control center or emergency room at once.  NOTE: This medicine is only for you. Do not share this medicine with others.  What if I miss a dose?  If you miss a dose, take it as soon as you can. If it is almost time for your next dose, take only that dose. Do not take double or extra doses.  What may interact with this medicine?  -herbal or dietary supplements  -local or general anesthetics  -medicines for high blood pressure  -medicines for prostate problems  -rifampin  This list may not describe all possible interactions. Give your health care provider a list of all the medicines, herbs, non-prescription drugs, or dietary supplements you use. Also tell them if you smoke, drink alcohol, or use illegal drugs. Some items may interact with your medicine.  What should I watch for while using this medicine?  Visit your doctor or health care professional for regular check ups. Check your blood pressure and pulse rate regularly. Ask your health care professional what your blood pressure and pulse rate should be, and when you should contact him or  her.  This medicine may make you feel confused, dizzy or lightheaded. Do not drive, use machinery, or do anything that needs mental alertness until you know how this medicine affects you. To reduce the risk of dizzy or fainting spells, do not sit or stand up quickly, especially if you are an older patient. Avoid alcoholic drinks; they can make you more dizzy.  Do not suddenly stop taking amlodipine. Ask your doctor or health care professional how you can gradually reduce the dose.  What side effects may I notice from receiving this medicine?  Side effects that you should report to your doctor or health care professional as soon as possible:  -allergic reactions like skin rash, itching or hives, swelling of the face, lips, or tongue  -breathing problems  -changes in vision or hearing  -chest pain  -fast, irregular heartbeat  -swelling of legs or ankles  Side effects that usually do not require medical attention (report to your doctor or health care professional if they continue or are bothersome):  -dry mouth  -facial flushing  -nausea, vomiting  -stomach gas, pain  -tired, weak  -trouble sleeping  This list may not describe all possible side effects. Call your doctor for medical advice about side effects. You may report side effects to FDA at 0-775-FDA-1922.  Where should I keep my medicine?  Keep out of the reach of children.  Store at room temperature between 59 and 86 degrees F (15 and 30 degrees C). Protect from light. Keep container tightly closed. Throw away any unused medicine after the expiration date.  NOTE: This sheet is a summary. It may not cover all possible information. If you have questions about this medicine, talk to your doctor, pharmacist, or health care provider.  © 2018 Elsevier/Gold Standard (2013-11-15 11:40:58)    Lisinopril tablets  What is this medicine?  LISINOPRIL (lyse IN oh pril) is an ACE inhibitor. This medicine is used to treat high blood pressure and heart failure. It is also used  to protect the heart immediately after a heart attack.  This medicine may be used for other purposes; ask your health care provider or pharmacist if you have questions.  COMMON BRAND NAME(S): Prinivil, Zestril  What should I tell my health care provider before I take this medicine?  They need to know if you have any of these conditions:  -diabetes  -heart or blood vessel disease  -kidney disease  -low blood pressure  -previous swelling of the tongue, face, or lips with difficulty breathing, difficulty swallowing, hoarseness, or tightening of the throat  -an unusual or allergic reaction to lisinopril, other ACE inhibitors, insect venom, foods, dyes, or preservatives  -pregnant or trying to get pregnant  -breast-feeding  How should I use this medicine?  Take this medicine by mouth with a glass of water. Follow the directions on your prescription label. You may take this medicine with or without food. If it upsets your stomach, take it with food. Take your medicine at regular intervals. Do not take it more often than directed. Do not stop taking except on your doctor's advice.  Talk to your pediatrician regarding the use of this medicine in children. Special care may be needed. While this drug may be prescribed for children as young as 6 years of age for selected conditions, precautions do apply.  Overdosage: If you think you have taken too much of this medicine contact a poison control center or emergency room at once.  NOTE: This medicine is only for you. Do not share this medicine with others.  What if I miss a dose?  If you miss a dose, take it as soon as you can. If it is almost time for your next dose, take only that dose. Do not take double or extra doses.  What may interact with this medicine?  Do not take this medicine with any of the following medications:  -hymenoptera venom  -sacubitril; valsartan  This medicines may also interact with the following medications:  -aliskiren  -angiotensin receptor blockers,  like losartan or valsartan  -certain medicines for diabetes  -diuretics  -everolimus  -gold compounds  -lithium  -NSAIDs, medicines for pain and inflammation, like ibuprofen or naproxen  -potassium salts or supplements  -salt substitutes  -sirolimus  -temsirolimus  This list may not describe all possible interactions. Give your health care provider a list of all the medicines, herbs, non-prescription drugs, or dietary supplements you use. Also tell them if you smoke, drink alcohol, or use illegal drugs. Some items may interact with your medicine.  What should I watch for while using this medicine?  Visit your doctor or health care professional for regular check ups. Check your blood pressure as directed. Ask your doctor what your blood pressure should be, and when you should contact him or her.  Do not treat yourself for coughs, colds, or pain while you are using this medicine without asking your doctor or health care professional for advice. Some ingredients may increase your blood pressure.  Women should inform their doctor if they wish to become pregnant or think they might be pregnant. There is a potential for serious side effects to an unborn child. Talk to your health care professional or pharmacist for more information.  Check with your doctor or health care professional if you get an attack of severe diarrhea, nausea and vomiting, or if you sweat a lot. The loss of too much body fluid can make it dangerous for you to take this medicine.  You may get drowsy or dizzy. Do not drive, use machinery, or do anything that needs mental alertness until you know how this drug affects you. Do not stand or sit up quickly, especially if you are an older patient. This reduces the risk of dizzy or fainting spells. Alcohol can make you more drowsy and dizzy. Avoid alcoholic drinks.  Avoid salt substitutes unless you are told otherwise by your doctor or health care professional.  What side effects may I notice from receiving  this medicine?  Side effects that you should report to your doctor or health care professional as soon as possible:  -allergic reactions like skin rash, itching or hives, swelling of the hands, feet, face, lips, throat, or tongue  -breathing problems  -signs and symptoms of kidney injury like trouble passing urine or change in the amount of urine  -signs and symptoms of increased potassium like muscle weakness; chest pain; or fast, irregular heartbeat  -signs and symptoms of liver injury like dark yellow or brown urine; general ill feeling or flu-like symptoms; light-colored stools; loss of appetite; nausea; right upper belly pain; unusually weak or tired; yellowing of the eyes or skin  -signs and symptoms of low blood pressure like dizziness; feeling faint or lightheaded, falls; unusually weak or tired  -stomach pain with or without nausea and vomiting  Side effects that usually do not require medical attention (report to your doctor or health care professional if they continue or are bothersome):  -changes in taste  -cough  -dizziness  -fever  -headache  -sensitivity to light  This list may not describe all possible side effects. Call your doctor for medical advice about side effects. You may report side effects to FDA at 7-529-FDA-6299.  Where should I keep my medicine?  Keep out of the reach of children.  Store at room temperature between 15 and 30 degrees C (59 and 86 degrees F). Protect from moisture. Keep container tightly closed. Throw away any unused medicine after the expiration date.  NOTE: This sheet is a summary. It may not cover all possible information. If you have questions about this medicine, talk to your doctor, pharmacist, or health care provider.  © 2018 Elsevier/Gold Standard (2017-02-06 12:52:35)

## 2019-02-07 NOTE — PROGRESS NOTES
Patient discharged to home via car with self. Discharge paperwork was discussed with the patient. LDAs were removed. Tele monitor disconnected. Pt prescriptions sent to pharmacy.

## 2019-02-07 NOTE — PROGRESS NOTES
Bedside report received, patient care assumed. Patient is showing no signs of distress at this time. POC discussed with patient and verbalizes understanding. Patient denies any needs at this time. Bed is locked and in lowest position, call light within reach.

## 2019-02-07 NOTE — PROGRESS NOTES
Received Bedside report. Assumed care at 0700. This pt is AOx4, ambulatory and upself, voiding appropriately, denies pain. Patient and RN discussed plan of care: questions answered. Labs noted, assessment complete, patient tolerating NPO diet for stress test to be done today. Tele box in place. Pt is on RA. Call light in place, fall precautions in place, patient educated on importance of calling for assistance. No additional needs at this time.

## 2019-02-07 NOTE — CARE PLAN
Problem: Communication  Goal: The ability to communicate needs accurately and effectively will improve  Outcome: PROGRESSING AS EXPECTED  Patient is able to effectively communicate needs and calls appropriately.     Problem: Safety  Goal: Will remain free from falls  Outcome: PROGRESSING AS EXPECTED  Fall risk assessed, fall precautions in place, bed alarm off, pt verbalizes understanding of fall risk.

## 2019-02-07 NOTE — DISCHARGE PLANNING
Pt was discussed in rounds and Dr. Mi states that pt will need a PCP at d/c. Met with pt and he is in agreement with this and prefers a location in [place].   Called [facility] scheduling and pt is set up for Thursday February 21st at 1:10 pm with Dr. Quintanilla [facility]   CM let pt know and provided appointment details.   Plan: No further needs.

## 2019-02-07 NOTE — DISCHARGE SUMMARY
Discharge Summary    CHIEF COMPLAINT ON ADMISSION  Chief Complaint   Patient presents with   • Hypertension       Reason for Admission  Other     Admission Date  2/5/2019    CODE STATUS  Full Code    HPI & HOSPITAL COURSE  This is a 38 y.o. male here with anxiety, and headache with transient lightheadedness who was found to have malignant hypertension with mild alcohol withdraw following binge alcohol use during the super bowl.  Blood pressure medications were started and titrated, he was set up with a new pcp and agrees to further blood pressure monitoring and titration as needed. He also had mild chest pressure which resolved, a cardiac stress test was negative for ischemia.  He is at risk for sleep apnea and an outpatient sleep study was recommended and has mildly elevated hemoglobin, he states he will follow up with his new pcp for this.      Therefore, he is discharged in fair and stable condition to home with close outpatient follow-up.    The patient met 2-midnight criteria for an inpatient stay at the time of discharge.    Discharge Date  2/7/19    FOLLOW UP ITEMS POST DISCHARGE  pcp    DISCHARGE DIAGNOSES  Principal Problem (Resolved):    Alcohol withdrawal (HCC) POA: Unknown  Active Problems:    Class 3 severe obesity due to excess calories without serious comorbidity with body mass index (BMI) of 45.0 to 49.9 in adult (HCC) POA: Yes    Essential hypertension POA: Yes    Troponin I above reference range POA: Unknown      FOLLOW UP  Future Appointments  Date Time Provider Department Center   2/21/2019 1:10 PM EB Sellers M.D.  [address] [place], [place] [address]e [place] [id number]  [facility] [id number]  [phone]    In 1 week        MEDICATIONS ON DISCHARGE     Medication List      START taking these medications      Instructions   amLODIPine 5 MG Tabs  Start taking on:  2/8/2019  Commonly known as:  NORVASC   Doctor's comments:  Hold sbp less 120  Take 1 Tab by mouth every day.  Dose:  5  mg     lisinopril 2.5 MG Tabs  Start taking on:  2/8/2019  Commonly known as:  PRINIVIL   Doctor's comments:  Hold sbp less 120  Take 1 Tab by mouth every day.  Dose:  2.5 mg            Allergies  No Active Allergies    DIET  Orders Placed This Encounter   Procedures   • Diet Order Cardiac     Standing Status:   Standing     Number of Occurrences:   1     Order Specific Question:   Diet:     Answer:   Cardiac [6]       ACTIVITY  As tolerated.    CONSULTATIONS      PROCEDURES  NM-CARDIAC STRESS TEST   Final Result      DX-CHEST-PORTABLE (1 VIEW)   Final Result         1.  No acute cardiopulmonary disease.            LABORATORY  Lab Results   Component Value Date    SODIUM 135 02/07/2019    POTASSIUM 4.4 02/07/2019    CHLORIDE 104 02/07/2019    CO2 22 02/07/2019    GLUCOSE 111 (H) 02/07/2019    BUN 14 02/07/2019    CREATININE 0.81 02/07/2019    CREATININE 0.69 11/28/2010    GLOMRATE CANCELED 11/28/2010        Lab Results   Component Value Date    WBC 8.3 02/07/2019    WBC 11.8 (H) 11/28/2010    HEMOGLOBIN 18.3 (H) 02/07/2019    HEMATOCRIT 54.5 (H) 02/07/2019    PLATELETCT 257 02/07/2019        Total time of the discharge process exceeds 45 minutes.

## 2019-02-11 NOTE — DOCUMENTATION QUERY
[facility]                                                                         Query Response Note      PATIENT:               GAYLE DUFFY  ACCT #:                  [id number]  MRN:                       [id number]  :                       1980  ADMIT DATE:       2019 10:43 PM  DISCH DATE:        2019 2:06 PM  RESPONDING  PROVIDER #:        [id number]           RESPONSE TEXT:    Hypertensive Emergency is ruled in    QUERY TEXT:    Hypertension Specificity [id number]    Hypertensive Emergency is documented in the ER Provider Note but not in the H&P.  Can this condition be clarified?    NOTE:  If an appropriate response is not listed below, please respond with a new note.    The patient's Clinical Indicators include:  VS (BP) Flowsheet:  @ 2246 217/104, @ 2250 217/104, @ 2320 196/98;  @ 0145 206/100, @ 0219 201/129, @ 0400 109/121  ER MD: Final Impression: Hypertensive emergency  Treatment: Norvasc, Clonidine, Vasotec, Hydralazine, Lisinopril  Risk Factor: Alcohol withdrawal, Stopped taking home BP medications, Hypertension, Obesity  Query created by: Jose L Goldsmith on 2019 2:15 PM        Electronically signed by:  KIM BATES MD 2019 1:36 PM

## 2019-02-21 ENCOUNTER — OFFICE VISIT (OUTPATIENT)
Dept: MEDICAL GROUP | Facility: PHYSICIAN GROUP | Age: 39
End: 2019-02-21
Payer: COMMERCIAL

## 2019-02-21 VITALS
WEIGHT: 315 LBS | BODY MASS INDEX: 42.66 KG/M2 | RESPIRATION RATE: 12 BRPM | SYSTOLIC BLOOD PRESSURE: 132 MMHG | TEMPERATURE: 98.6 F | DIASTOLIC BLOOD PRESSURE: 88 MMHG | HEART RATE: 74 BPM | HEIGHT: 72 IN | OXYGEN SATURATION: 96 %

## 2019-02-21 DIAGNOSIS — Z11.3 SCREEN FOR STD (SEXUALLY TRANSMITTED DISEASE): ICD-10-CM

## 2019-02-21 DIAGNOSIS — G89.29 CHRONIC HEEL PAIN, RIGHT: ICD-10-CM

## 2019-02-21 DIAGNOSIS — E78.5 DYSLIPIDEMIA: ICD-10-CM

## 2019-02-21 DIAGNOSIS — E53.8 VITAMIN B12 DEFICIENCY: ICD-10-CM

## 2019-02-21 DIAGNOSIS — E55.9 VITAMIN D DEFICIENCY: ICD-10-CM

## 2019-02-21 DIAGNOSIS — G47.30 SLEEP APNEA, UNSPECIFIED TYPE: ICD-10-CM

## 2019-02-21 DIAGNOSIS — Z91.018 FOOD ALLERGY: ICD-10-CM

## 2019-02-21 DIAGNOSIS — I10 ESSENTIAL HYPERTENSION: ICD-10-CM

## 2019-02-21 DIAGNOSIS — M79.671 CHRONIC HEEL PAIN, RIGHT: ICD-10-CM

## 2019-02-21 DIAGNOSIS — R53.83 FATIGUE, UNSPECIFIED TYPE: ICD-10-CM

## 2019-02-21 DIAGNOSIS — F10.10 ALCOHOL ABUSE: ICD-10-CM

## 2019-02-21 DIAGNOSIS — Z51.81 MEDICATION MONITORING ENCOUNTER: ICD-10-CM

## 2019-02-21 DIAGNOSIS — R73.9 HYPERGLYCEMIA: ICD-10-CM

## 2019-02-21 PROBLEM — R79.89 TROPONIN I ABOVE REFERENCE RANGE: Status: RESOLVED | Noted: 2019-02-06 | Resolved: 2019-02-21

## 2019-02-21 PROCEDURE — 99205 OFFICE O/P NEW HI 60 MIN: CPT | Performed by: FAMILY MEDICINE

## 2019-02-21 RX ORDER — AMLODIPINE BESYLATE 5 MG/1
5 TABLET ORAL DAILY
Qty: 30 TAB | Refills: 1 | Status: SHIPPED | OUTPATIENT
Start: 2019-02-21 | End: 2019-04-17 | Stop reason: SDUPTHER

## 2019-02-21 RX ORDER — HYDROCHLOROTHIAZIDE 12.5 MG/1
12.5 TABLET ORAL DAILY
Qty: 30 TAB | Refills: 1 | Status: SHIPPED | OUTPATIENT
Start: 2019-02-21 | End: 2019-04-17

## 2019-02-21 RX ORDER — LISINOPRIL 5 MG/1
5 TABLET ORAL DAILY
Qty: 30 TAB | Refills: 1 | Status: SHIPPED | OUTPATIENT
Start: 2019-02-21 | End: 2019-04-17 | Stop reason: SDUPTHER

## 2019-02-21 NOTE — PROGRESS NOTES
cc: Establish care with myself, hypertension, alcohol use, BMI 42, baseline lab work.    Subjective:     Oswald Schuler is a 38 y.o. male presenting  Establish care with myself, hypertension, alcohol use, BMI 42, baseline lab work. Admitted to hospital for 2 days on 2/5/19.  He was admitted for 2 days at [facility] after [place] Sunday, he had some more alcohol to drink.  When he was in the hospital he was found to have malignant hypertension some mild withdrawal and some anxiety and increased blood pressure.  Cardiac stress test was done which was negative there was a mild elevation in his hemoglobin.  He did well and then was started on 2 blood pressure medications and was discharged in 2 days and he has not had alcohol since then.  He denies any anxiety or depression.  He is quite aware of what is happening.  He reports in the last year his weight has been around the same with a BMI of 42.  He currently is living alone.  Works full-time in the ER as ER tech.  He has not had a primary care doctor in a while.  Also had a slight increase in his troponin but his heart was okay while he was in the hospital.  He also had a slight elevation in his sugar.  He will need follow-up lab work. He does snore at night and wakes up with some fatigue. He does use breathing strips at night. He does report some food allergies but no drug allergies and never saw allergist. Never had anaphylaxis. Does not want dietician referral right now. Taking blood pressure medications once a day and takes lisinopril in the morning and amlodipine at night. Only has one month supply.  Did take BP logs in his right arm. His BP ranges 121-162/. He is trying to limit his sodium but he does have at time. He drinks 2-4 cups coffee as he works 12 hour night shift and works overtime. No recent infection or antibiotic use. Has right heel pain for now 7 months, walking a lot at work. NO major injury. Was hiking and pain happened when  pushed.        Review of systems:     Constitutional: Negative for fever, chills and positive fatigue.   HENT: Negative for sinus pressure, negative for ear pain  Eyes: Negative for blurriness, negative for double vision  Respiratory: Negative for cough and shortness of breath, negative for exertional shortness of breath, positive snoring  Cardiovascular: Negative for leg swelling, negative for palpitations, negative for chest pain  Gastrointestinal: Negative for nausea, vomiting, abdominal pain and diarrhea.  Genitourinary: Negative for dysuria and hematuria.   Skin: Negative for rash.   Neurological: Negative for dizziness, focal weakness and headaches.   Endo/Heme/Allergies: Denies bleeding, bruising, and recurrent allergies.  Psychiatric/Behavioral: Negative for depression and anxiety.        Current Outpatient Prescriptions:   •  Multiple Vitamin (MULTI VITAMIN DAILY PO), Take  by mouth., Disp: , Rfl:   •  hydroCHLOROthiazide (HYDRODIURIL) 12.5 MG tablet, Take 1 Tab by mouth every day., Disp: 30 Tab, Rfl: 1  •  amLODIPine (NORVASC) 5 MG Tab, Take 1 Tab by mouth every day., Disp: 30 Tab, Rfl: 1  •  lisinopril (PRINIVIL) 5 MG Tab, Take 1 Tab by mouth every day., Disp: 30 Tab, Rfl: 1    Allergies, past medical history, past surgical history, family history, social history reviewed and updated    Objective:     Vitals: /88 (BP Location: Left arm, Patient Position: Sitting, BP Cuff Size: Large adult)   Pulse 74   Temp 37 °C (98.6 °F) (Temporal)   Resp 12   Ht 1.829 m (6')   Wt (!) 143.5 kg (316 lb 6.4 oz)   SpO2 96%   BMI 42.91 kg/m²   General: Alert, pleasant, NAD, obese  HEENT: Normocephalic.  EOMI, no icterus or pallor.  Conjunctivae and lids normal. External ears normal. Oropharynx non-erythematous, mucous membranes moist.  Neck supple.  No thyromegaly or masses palpated. No cervical or supraclavicular lymphadenopathy.  Heart: Regular rate and rhythm.  S1 and S2 normal.  No murmurs  appreciated.  Respiratory: Normal respiratory effort.  Clear to auscultation bilaterally.  Abdomen: Non-distended, soft, non tender on palpation of all quadrants.  Skin: Warm, dry, no rashes.  Musculoskeletal: Gait is normal.  Moves all extremities well.  Extremities: No leg edema.  Pedal pulses 2+ symmetric.   Psych:  Affect/mood is normal, judgement is good, memory is intact, grooming is appropriate. Good insight and awareness and focus.    Assessment/Plan:     Oswald was seen today for establish care.    Diagnoses and all orders for this visit:    Essential hypertension  -     HEMOGLOBIN A1C; Future  -     hydroCHLOROthiazide (HYDRODIURIL) 12.5 MG tablet; Take 1 Tab by mouth every day.  -     amLODIPine (NORVASC) 5 MG Tab; Take 1 Tab by mouth every day.  -     lisinopril (PRINIVIL) 5 MG Tab; Take 1 Tab by mouth every day.    BMI 40.0-44.9, adult (HCC)    Alcohol abuse    Sleep apnea, unspecified type  -     REFERRAL TO SLEEP STUDIES    Fatigue, unspecified type  -     CBC WITH DIFFERENTIAL; Future  -     TSH WITH REFLEX TO FT4; Future    Medication monitoring encounter  -     Comp Metabolic Panel; Future    Hyperglycemia    Dyslipidemia  -     Lipid Profile; Future    Vitamin D deficiency  -     VITAMIN D,25 HYDROXY; Future    Vitamin B12 deficiency  -     VITAMIN B12; Future    Screen for STD (sexually transmitted disease)  -     HEPATITIS PANEL ACUTE(4 COMPONENTS); Future  -     HIV AG/AB COMBO ASSAY SCREENING; Future  -     RPR (SYPHILIS); Future  -     CHLAMYDIA/GC PCR URINE OR SWAB; Future    Food allergy  -     REFERRAL TO ALLERGY    Chronic heel pain, right    -Continue 5 mg daily of amlodipine and 2 months refill given, will start a new medication hydrochlorothiazide 12.5 mg once daily and will increase lisinopril from 2.5 mg to 5 mg daily and 2 months given a fall of this.  Patient was informed that blood pressure can take about 3-4 weeks to stabilize.  Please take all medications as prescribed.  Please  do some homework and check the blood pressure in both arms top number and bottom number and heart rate at random times morning afternoon and evening and bring in at next appointment please.  Please reduce alcohol intake and caffeine intake.  He does report having 0 alcohol intake at this time and denies any anxiety or depression or need for any counseling.  He is going to work on his diet and exercise.  I did recommend you to being beginners yoga for stretching and try to get access to pool for water therapy and low impact aerobic activity.  Get fasting lab work before next appointment.  Will send referral for a sleep study for possible sleep apnea.  Will send a referral to the allergy specialist for possible food allergies.  Denies any anaphylaxis or need for an EpiPen.  He is going to work on his diet and exercise dietitian referral was offered but he declined for now and will wait for this.  He was given the flu vaccine this flu season.  ER precautions and hypertension and stroke precautions given and if any chest pain, palpitations, dizziness, weakness, shortness of breath, paralysis, numbness or drooping then please go to the ER or call 911.  His chronic right heel pain looks like tendinitis and he is going to try icing this for about 20 minutes after work, wearing proper running shoes with inserts, doing home physical therapy exercise as with the tennis ball and toe raise lift test and you to being the heel exercises, and after long day of work he can try NSAIDs for 1-2 weeks to help reduce inflammation by taking 400-600 mg of over-the-counter ibuprofen.  Do not take these pain medications more than prescribed as it can cause kidney and stomach issues and we will be checking his kidney and liver function out.  He will do his blood pressure log and lab work and see me back in about 3-4 weeks.  Her blood pressure goal is to have his blood pressure under 130/80.  Hypotensive and hypertension precautions  given.      Return in about 4 weeks (around 3/21/2019), or hypertension/labs/meds/referral.      Patient was seen for 60 minutes face to face of which, 30 minutes was spent counseling regarding above plan management.

## 2019-02-21 NOTE — PATIENT INSTRUCTIONS
Oswald was seen today for establish care.    Diagnoses and all orders for this visit:    Essential hypertension  -     HEMOGLOBIN A1C; Future  -     hydroCHLOROthiazide (HYDRODIURIL) 12.5 MG tablet; Take 1 Tab by mouth every day.  -     amLODIPine (NORVASC) 5 MG Tab; Take 1 Tab by mouth every day.  -     lisinopril (PRINIVIL) 5 MG Tab; Take 1 Tab by mouth every day.    BMI 40.0-44.9, adult (HCC)    Alcohol abuse    Sleep apnea, unspecified type  -     REFERRAL TO SLEEP STUDIES    Fatigue, unspecified type  -     CBC WITH DIFFERENTIAL; Future  -     TSH WITH REFLEX TO FT4; Future    Medication monitoring encounter  -     Comp Metabolic Panel; Future    Hyperglycemia    Dyslipidemia  -     Lipid Profile; Future    Vitamin D deficiency  -     VITAMIN D,25 HYDROXY; Future    Vitamin B12 deficiency  -     VITAMIN B12; Future    Screen for STD (sexually transmitted disease)  -     HEPATITIS PANEL ACUTE(4 COMPONENTS); Future  -     HIV AG/AB COMBO ASSAY SCREENING; Future  -     RPR (SYPHILIS); Future  -     CHLAMYDIA/GC PCR URINE OR SWAB; Future    Food allergy  -     REFERRAL TO ALLERGY    Chronic heel pain, right    -Continue 5 mg daily of amlodipine and 2 months refill given, will start a new medication hydrochlorothiazide 12.5 mg once daily and will increase lisinopril from 2.5 mg to 5 mg daily and 2 months given a fall of this.  Patient was informed that blood pressure can take about 3-4 weeks to stabilize.  Please take all medications as prescribed.  Please do some homework and check the blood pressure in both arms top number and bottom number and heart rate at random times morning afternoon and evening and bring in at next appointment please.  Please reduce alcohol intake and caffeine intake.  He does report having 0 alcohol intake at this time and denies any anxiety or depression or need for any counseling.  He is going to work on his diet and exercise.  I did recommend you to being beginners yoga for stretching  and try to get access to pool for water therapy and low impact aerobic activity.  Get fasting lab work before next appointment.  Will send referral for a sleep study for possible sleep apnea.  Will send a referral to the allergy specialist for possible food allergies.  Denies any anaphylaxis or need for an EpiPen.  He is going to work on his diet and exercise dietitian referral was offered but he declined for now and will wait for this.  He was given the flu vaccine this flu season.  ER precautions and hypertension and stroke precautions given and if any chest pain, palpitations, dizziness, weakness, shortness of breath, paralysis, numbness or drooping then please go to the ER or call 911.  His chronic right heel pain looks like tendinitis and he is going to try icing this for about 20 minutes after work, wearing proper running shoes with inserts, doing home physical therapy exercise as with the tennis ball and toe raise lift test and you to being the heel exercises, and after long day of work he can try NSAIDs for 1-2 weeks to help reduce inflammation by taking 400-600 mg of over-the-counter ibuprofen.  Do not take these pain medications more than prescribed as it can cause kidney and stomach issues and we will be checking his kidney and liver function out.  He will do his blood pressure log and lab work and see me back in about 3-4 weeks.  Her blood pressure goal is to have his blood pressure under 130/80.  Hypotensive and hypertension precautions given.      Return in about 4 weeks (around 3/21/2019), or hypertension/labs/meds/referral.      Patient was seen for 60 minutes face to face of which, 30 minutes was spent counseling regarding above plan management.

## 2019-04-12 ENCOUNTER — HOSPITAL ENCOUNTER (OUTPATIENT)
Dept: LAB | Facility: MEDICAL CENTER | Age: 39
End: 2019-04-12
Attending: FAMILY MEDICINE
Payer: COMMERCIAL

## 2019-04-12 DIAGNOSIS — E55.9 VITAMIN D DEFICIENCY: ICD-10-CM

## 2019-04-12 DIAGNOSIS — E78.5 DYSLIPIDEMIA: ICD-10-CM

## 2019-04-12 DIAGNOSIS — I10 ESSENTIAL HYPERTENSION: ICD-10-CM

## 2019-04-12 DIAGNOSIS — R53.83 FATIGUE, UNSPECIFIED TYPE: ICD-10-CM

## 2019-04-12 DIAGNOSIS — Z11.3 SCREEN FOR STD (SEXUALLY TRANSMITTED DISEASE): ICD-10-CM

## 2019-04-12 DIAGNOSIS — Z51.81 MEDICATION MONITORING ENCOUNTER: ICD-10-CM

## 2019-04-12 DIAGNOSIS — E53.8 VITAMIN B12 DEFICIENCY: ICD-10-CM

## 2019-04-12 LAB
BASOPHILS # BLD AUTO: 0.7 % (ref 0–1.8)
BASOPHILS # BLD: 0.06 K/UL (ref 0–0.12)
EOSINOPHIL # BLD AUTO: 0.2 K/UL (ref 0–0.51)
EOSINOPHIL NFR BLD: 2.4 % (ref 0–6.9)
ERYTHROCYTE [DISTWIDTH] IN BLOOD BY AUTOMATED COUNT: 42 FL (ref 35.9–50)
EST. AVERAGE GLUCOSE BLD GHB EST-MCNC: 114 MG/DL
HBA1C MFR BLD: 5.6 % (ref 0–5.6)
HCT VFR BLD AUTO: 54.1 % (ref 42–52)
HGB BLD-MCNC: 18.1 G/DL (ref 14–18)
IMM GRANULOCYTES # BLD AUTO: 0.06 K/UL (ref 0–0.11)
IMM GRANULOCYTES NFR BLD AUTO: 0.7 % (ref 0–0.9)
LYMPHOCYTES # BLD AUTO: 2.32 K/UL (ref 1–4.8)
LYMPHOCYTES NFR BLD: 27.3 % (ref 22–41)
MCH RBC QN AUTO: 29.5 PG (ref 27–33)
MCHC RBC AUTO-ENTMCNC: 33.5 G/DL (ref 33.7–35.3)
MCV RBC AUTO: 88.3 FL (ref 81.4–97.8)
MONOCYTES # BLD AUTO: 0.69 K/UL (ref 0–0.85)
MONOCYTES NFR BLD AUTO: 8.1 % (ref 0–13.4)
NEUTROPHILS # BLD AUTO: 5.17 K/UL (ref 1.82–7.42)
NEUTROPHILS NFR BLD: 60.8 % (ref 44–72)
NRBC # BLD AUTO: 0 K/UL
NRBC BLD-RTO: 0 /100 WBC
PLATELET # BLD AUTO: 279 K/UL (ref 164–446)
PMV BLD AUTO: 10.3 FL (ref 9–12.9)
RBC # BLD AUTO: 6.13 M/UL (ref 4.7–6.1)
WBC # BLD AUTO: 8.5 K/UL (ref 4.8–10.8)

## 2019-04-12 PROCEDURE — 82607 VITAMIN B-12: CPT

## 2019-04-12 PROCEDURE — 87591 N.GONORRHOEAE DNA AMP PROB: CPT

## 2019-04-12 PROCEDURE — 82306 VITAMIN D 25 HYDROXY: CPT

## 2019-04-12 PROCEDURE — 36415 COLL VENOUS BLD VENIPUNCTURE: CPT

## 2019-04-12 PROCEDURE — 83036 HEMOGLOBIN GLYCOSYLATED A1C: CPT

## 2019-04-12 PROCEDURE — 80074 ACUTE HEPATITIS PANEL: CPT

## 2019-04-12 PROCEDURE — 86780 TREPONEMA PALLIDUM: CPT

## 2019-04-12 PROCEDURE — 87389 HIV-1 AG W/HIV-1&-2 AB AG IA: CPT

## 2019-04-12 PROCEDURE — 87491 CHLMYD TRACH DNA AMP PROBE: CPT

## 2019-04-12 PROCEDURE — 80053 COMPREHEN METABOLIC PANEL: CPT

## 2019-04-12 PROCEDURE — 80061 LIPID PANEL: CPT

## 2019-04-12 PROCEDURE — 84443 ASSAY THYROID STIM HORMONE: CPT

## 2019-04-12 PROCEDURE — 85025 COMPLETE CBC W/AUTO DIFF WBC: CPT

## 2019-04-13 LAB
25(OH)D3 SERPL-MCNC: 12 NG/ML (ref 30–100)
ALBUMIN SERPL BCP-MCNC: 4.7 G/DL (ref 3.2–4.9)
ALBUMIN/GLOB SERPL: 1.8 G/DL
ALP SERPL-CCNC: 63 U/L (ref 30–99)
ALT SERPL-CCNC: 23 U/L (ref 2–50)
ANION GAP SERPL CALC-SCNC: 8 MMOL/L (ref 0–11.9)
AST SERPL-CCNC: 20 U/L (ref 12–45)
BILIRUB SERPL-MCNC: 0.8 MG/DL (ref 0.1–1.5)
BUN SERPL-MCNC: 15 MG/DL (ref 8–22)
C TRACH DNA SPEC QL NAA+PROBE: NEGATIVE
CALCIUM SERPL-MCNC: 8.9 MG/DL (ref 8.5–10.5)
CHLORIDE SERPL-SCNC: 104 MMOL/L (ref 96–112)
CHOLEST SERPL-MCNC: 219 MG/DL (ref 100–199)
CO2 SERPL-SCNC: 26 MMOL/L (ref 20–33)
CREAT SERPL-MCNC: 0.77 MG/DL (ref 0.5–1.4)
FASTING STATUS PATIENT QL REPORTED: NORMAL
GLOBULIN SER CALC-MCNC: 2.6 G/DL (ref 1.9–3.5)
GLUCOSE SERPL-MCNC: 94 MG/DL (ref 65–99)
HAV IGM SERPL QL IA: NEGATIVE
HBV CORE IGM SER QL: NEGATIVE
HBV SURFACE AG SER QL: NEGATIVE
HCV AB SER QL: NEGATIVE
HDLC SERPL-MCNC: 49 MG/DL
HIV 1+2 AB+HIV1 P24 AG SERPL QL IA: NON REACTIVE
LDLC SERPL CALC-MCNC: 146 MG/DL
N GONORRHOEA DNA SPEC QL NAA+PROBE: NEGATIVE
POTASSIUM SERPL-SCNC: 4.1 MMOL/L (ref 3.6–5.5)
PROT SERPL-MCNC: 7.3 G/DL (ref 6–8.2)
SODIUM SERPL-SCNC: 138 MMOL/L (ref 135–145)
SPECIMEN SOURCE: NORMAL
TREPONEMA PALLIDUM IGG+IGM AB [PRESENCE] IN SERUM OR PLASMA BY IMMUNOASSAY: NON REACTIVE
TRIGL SERPL-MCNC: 119 MG/DL (ref 0–149)
TSH SERPL DL<=0.005 MIU/L-ACNC: 4.85 UIU/ML (ref 0.38–5.33)
VIT B12 SERPL-MCNC: 596 PG/ML (ref 211–911)

## 2019-04-17 ENCOUNTER — OFFICE VISIT (OUTPATIENT)
Dept: MEDICAL GROUP | Facility: PHYSICIAN GROUP | Age: 39
End: 2019-04-17
Payer: COMMERCIAL

## 2019-04-17 VITALS
OXYGEN SATURATION: 97 % | WEIGHT: 315 LBS | HEIGHT: 72 IN | TEMPERATURE: 98.8 F | SYSTOLIC BLOOD PRESSURE: 137 MMHG | DIASTOLIC BLOOD PRESSURE: 86 MMHG | RESPIRATION RATE: 14 BRPM | HEART RATE: 86 BPM | BODY MASS INDEX: 42.66 KG/M2

## 2019-04-17 DIAGNOSIS — I10 ESSENTIAL HYPERTENSION: ICD-10-CM

## 2019-04-17 DIAGNOSIS — M79.89 LEG SWELLING: ICD-10-CM

## 2019-04-17 DIAGNOSIS — E78.5 DYSLIPIDEMIA: ICD-10-CM

## 2019-04-17 DIAGNOSIS — Z51.81 MEDICATION MONITORING ENCOUNTER: ICD-10-CM

## 2019-04-17 DIAGNOSIS — E55.9 VITAMIN D DEFICIENCY: ICD-10-CM

## 2019-04-17 PROCEDURE — 99214 OFFICE O/P EST MOD 30 MIN: CPT | Performed by: FAMILY MEDICINE

## 2019-04-17 RX ORDER — AMLODIPINE BESYLATE 5 MG/1
5 TABLET ORAL DAILY
Qty: 90 TAB | Refills: 0 | Status: SHIPPED | OUTPATIENT
Start: 2019-04-17 | End: 2019-06-04 | Stop reason: SDUPTHER

## 2019-04-17 RX ORDER — ERGOCALCIFEROL 1.25 MG/1
CAPSULE ORAL
Qty: 12 CAP | Refills: 0 | Status: SHIPPED | OUTPATIENT
Start: 2019-04-17 | End: 2019-06-04 | Stop reason: SDUPTHER

## 2019-04-17 RX ORDER — HYDROCHLOROTHIAZIDE 25 MG/1
25 TABLET ORAL DAILY
Qty: 90 TAB | Refills: 0 | Status: SHIPPED | OUTPATIENT
Start: 2019-04-17 | End: 2019-06-04 | Stop reason: SDUPTHER

## 2019-04-17 RX ORDER — LISINOPRIL 5 MG/1
5 TABLET ORAL DAILY
Qty: 90 TAB | Refills: 0 | Status: SHIPPED | OUTPATIENT
Start: 2019-04-17 | End: 2019-06-04

## 2019-04-17 NOTE — PATIENT INSTRUCTIONS
Diagnoses and all orders for this visit:    Essential hypertension  -     hydroCHLOROthiazide (HYDRODIURIL) 25 MG Tab; Take 1 Tab by mouth every day.  -     lisinopril (PRINIVIL) 5 MG Tab; Take 1 Tab by mouth every day.  -     amLODIPine (NORVASC) 5 MG Tab; Take 1 Tab by mouth every day.    Vitamin D deficiency  -     ergocalciferol (DRISDOL) 03817 UNIT capsule; Take one tab po once a week.    Dyslipidemia    BMI 40.0-44.9, adult (HCC)  -     Patient identified as having weight management issue.  Appropriate orders and counseling given.    Medication monitoring encounter    -complete blood count was within normal limits with mild borderline increase of hemoglobin and hematocrit at 18.1 and 54.1, kidney GFR was within normal limits, complete metabolic panel within normal limits, A1c was 5.6, TSH thyroid was within normal limits, lipid was high with LDL at 146 and total cholesterol at 219, vitamin D was low at 12, vitamin B12 was normal at 596, STD panel of hepatitis, HIV, syphilis and gonorrhea and chlamydia was negative.  Went over lab work as above will start high-dose vitamin D 50,000 units once a week for 3 months will continue amlodipine 5 mg daily and lisinopril 5 mg daily and increase his hydrochlorothiazide to 25 mg daily and will recheck his vitamin D and CMP in 3 months and lipids in 6 months.  Recommend to start a low-dose statin medication but he wants to work on his diet and exercise and will recommend to do over-the-counter fish oil omega threes 2 times a day with meals and can also try red yeast over-the-counter once a day to help with his cholesterol.  We discussed that due to this and increased chance of heart attack and stroke to work on his diet.  Dietary referral offered but he wants to wait and work on this.  Would recommend try a combination of keto/Mediterranean/Ayurvedic diet try some apps like lose it jami for weight watchers if he wants.  Please try to do some of the yoga stretching and  exercises to avoid any degeneration of the osteoarthritis and tennis ball roller rubs for his foot and icing and continue wearing compression stockings.  Please go and get sleep study done and allergy referral appointment and paper given for number to call.  ER precautions given if any chest pain, shortness of breath, problems breathing, dizziness and please go to the ER or call 911.  3-month of medication given for the patient and please continue to do blood pressure logs at least 3 times a week sometimes morning, afternoon, evening the top number and the bottom number and heart rate and bring this to your next appointment.    Return in about 7 weeks (around 6/4/2019), or High blood pressure/BMI, for Long, 40 min appnt.  Preventing Type 2 Diabetes Mellitus  Type 2 diabetes (type 2 diabetes mellitus) is a long-term (chronic) disease that affects blood sugar (glucose) levels. Normally, a hormone called insulin allows glucose to enter cells in the body. The cells use glucose for energy. In type 2 diabetes, one or both of these problems may be present:  · The body does not make enough insulin.  · The body does not respond properly to insulin that it makes (insulin resistance).  Insulin resistance or lack of insulin causes excess glucose to build up in the blood instead of going into cells. As a result, high blood glucose (hyperglycemia) develops, which can cause many complications. Being overweight or obese and having an inactive (sedentary) lifestyle can increase your risk for diabetes. Type 2 diabetes can be delayed or prevented by making certain nutrition and lifestyle changes.  What nutrition changes can be made?  · Eat healthy meals and snacks regularly. Keep a healthy snack with you for when you get hungry between meals, such as fruit or a handful of nuts.  · Eat lean meats and proteins that are low in saturated fats, such as chicken, fish, egg whites, and beans. Avoid processed meats.  · Eat plenty of fruits and  vegetables and plenty of grains that have not been processed (whole grains). It is recommended that you eat:  ¨ 1½?2 cups of fruit every day.  ¨ 2½?3 cups of vegetables every day.  ¨ 6?8 oz of whole grains every day, such as oats, whole wheat, bulgur, brown rice, quinoa, and millet.  · Eat low-fat dairy products, such as milk, yogurt, and cheese.  · Eat foods that contain healthy fats, such as nuts, avocado, olive oil, and canola oil.  · Drink water throughout the day. Avoid drinks that contain added sugar, such as soda or sweet tea.  · Follow instructions from your health care provider about specific eating or drinking restrictions.  · Control how much food you eat at a time (portion size).  ¨ Check food labels to find out the serving sizes of foods.  ¨ Use a kitchen scale to weigh amounts of foods.  · Saute or steam food instead of frying it. Cook with water or broth instead of oils or butter.  · Limit your intake of:  ¨ Salt (sodium). Have no more than 1 tsp (2,400 mg) of sodium a day. If you have heart disease or high blood pressure, have less than ½?¾ tsp (1,500 mg) of sodium a day.  ¨ Saturated fat. This is fat that is solid at room temperature, such as butter or fat on meat.  What lifestyle changes can be made?   Activity  · Do moderate-intensity physical activity for at least 30 minutes on at least 5 days of the week, or as much as told by your health care provider.  · Ask your health care provider what activities are safe for you. A mix of physical activities may be best, such as walking, swimming, cycling, and strength training.  · Try to add physical activity into your day. For example:  ¨ Park in spots that are farther away than usual, so that you walk more. For example, park in a far corner of the parking lot when you go to the office or the grocery store.  ¨ Take a walk during your lunch break.  ¨ Use stairs instead of elevators or escalators.  Weight Loss  · Lose weight as directed. Your health care  provider can determine how much weight loss is best for you and can help you lose weight safely.  · If you are overweight or obese, you may be instructed to lose at least 5?7 % of your body weight.  Alcohol and Tobacco   · Limit alcohol intake to no more than 1 drink a day for nonpregnant women and 2 drinks a day for men. One drink equals 12 oz of beer, 5 oz of wine, or 1½ oz of hard liquor.  · Do not use any tobacco products, such as cigarettes, chewing tobacco, and e-cigarettes. If you need help quitting, ask your health care provider.  Work With Your Health Care Provider  · Have your blood glucose tested regularly, as told by your health care provider.  · Discuss your risk factors and how you can reduce your risk for diabetes.  · Get screening tests as told by your health care provider. You may have screening tests regularly, especially if you have certain risk factors for type 2 diabetes.  · Make an appointment with a diet and nutrition specialist (registered dietitian). A registered dietitian can help you make a healthy eating plan and can help you understand portion sizes and food labels.  Why are these changes important?  · It is possible to prevent or delay type 2 diabetes and related health problems by making lifestyle and nutrition changes.  · It can be difficult to recognize signs of type 2 diabetes. The best way to avoid possible damage to your body is to take actions to prevent the disease before you develop symptoms.  What can happen if changes are not made?  · Your blood glucose levels may keep increasing. Having high blood glucose for a long time is dangerous. Too much glucose in your blood can damage your blood vessels, heart, kidneys, nerves, and eyes.  · You may develop prediabetes or type 2 diabetes. Type 2 diabetes can lead to many chronic health problems and complications, such as:  ¨ Heart disease.  ¨ Stroke.  ¨ Blindness.  ¨ Kidney disease.  ¨ Depression.  ¨ Poor circulation in the feet and  legs, which could lead to surgical removal (amputation) in severe cases.  Where to find support:  · Ask your health care provider to recommend a registered dietitian, diabetes educator, or weight loss program.  · Look for local or online weight loss groups.  · Join a gym, fitness club, or outdoor activity group, such as a walking club.  Where to find more information:  To learn more about diabetes and diabetes prevention, visit:  · American Diabetes Association (ADA): www.diabetes.org  · National [place] of Diabetes and Digestive and Kidney Diseases: www.niddk.nih.gov/health-information/diabetes  To learn more about healthy eating, visit:  · The [place] Department of Agriculture ([employer] Codes), Choose My Plate: www.Inspro.gov/food-groups  · Office of Disease Prevention and Health Promotion (ODPHP), Dietary Guidelines: www.health.gov/dietaryguidelines  Summary  · You can reduce your risk for type 2 diabetes by increasing your physical activity, eating healthy foods, and losing weight as directed.  · Talk with your health care provider about your risk for type 2 diabetes. Ask about any blood tests or screening tests that you need to have.  This information is not intended to replace advice given to you by your health care provider. Make sure you discuss any questions you have with your health care provider.      Dyslipidemia  Dyslipidemia is an imbalance of waxy, fat-like substances (lipids) in the blood. The body needs lipids in small amounts. Dyslipidemia often involves a high level of cholesterol or triglycerides, which are types of lipids.  Common forms of dyslipidemia include:  · High levels of bad cholesterol (LDL cholesterol). LDL is the type of cholesterol that causes fatty deposits (plaques) to build up in the blood vessels that carry blood away from your heart (arteries).  · Low levels of good cholesterol (HDL cholesterol). HDL cholesterol is the type of cholesterol that protects against heart disease. High  levels of HDL remove the LDL buildup from arteries.  · High levels of triglycerides. Triglycerides are a fatty substance in the blood that is linked to a buildup of plaques in the arteries.  You can develop dyslipidemia because of the genes you are born with (primary dyslipidemia) or changes that occur during your life (secondary dyslipidemia), or as a side effect of certain medical treatments.  What are the causes?  Primary dyslipidemia is caused by changes (mutations) in genes that are passed down through families (inherited). These mutations cause several types of dyslipidemia. Mutations can result in disorders that make the body produce too much LDL cholesterol or triglycerides, or not enough HDL cholesterol. These disorders may lead to heart disease, arterial disease, or stroke at an early age.  Causes of secondary dyslipidemia include certain lifestyle choices and diseases that lead to dyslipidemia, such as:  · Eating a diet that is high in animal fat.  · Not getting enough activity or exercise (having a sedentary lifestyle).  · Having diabetes, kidney disease, liver disease, or thyroid disease.  · Drinking large amounts of alcohol.  · Using certain types of drugs.  What increases the risk?  You may be at greater risk for dyslipidemia if you are an older man or if you are a woman who has gone through menopause. Other risk factors include:  · Having a family history of dyslipidemia.  · Taking certain medicines, including birth control pills, steroids, some diuretics, beta-blockers, and some medicines for HIV.  · Smoking cigarettes.  · Eating a high-fat diet.  · Drinking large amounts of alcohol.  · Having certain medical conditions such as diabetes, polycystic ovary syndrome (PCOS), pregnancy, kidney disease, liver disease, or hypothyroidism.  · Not exercising regularly.  · Being overweight or obese with too much belly fat.  What are the signs or symptoms?  Dyslipidemia does not usually cause any  symptoms.  Very high lipid levels can cause fatty bumps under the skin (xanthomas) or a white or gray ring around the black center (pupil) of the eye. Very high triglyceride levels can cause inflammation of the pancreas (pancreatitis).  How is this diagnosed?  Your health care provider may diagnose dyslipidemia based on a routine blood test (fasting blood test). Because most people do not have symptoms of the condition, this blood testing (lipid profile) is done on adults age 20 and older and is repeated every 5 years. This test checks:  · Total cholesterol. This is a measure of the total amount of cholesterol in your blood, including LDL cholesterol, HDL cholesterol, and triglycerides. A healthy number is below 200.  · LDL cholesterol. The target number for LDL cholesterol is different for each person, depending on individual risk factors. For most people, a number below 100 is healthy. Ask your health care provider what your LDL cholesterol number should be.  · HDL cholesterol. An HDL level of 60 or higher is best because it helps to protect against heart disease. A number below 40 for men or below 50 for women increases the risk for heart disease.  · Triglycerides. A healthy triglyceride number is below 150.  If your lipid profile is abnormal, your health care provider may do other blood tests to get more information about your condition.  How is this treated?  Treatment depends on the type of dyslipidemia that you have and your other risk factors for heart disease and stroke. Your health care provider will have a target range for your lipid levels based on this information.  For many people, treatment starts with lifestyle changes, such as diet and exercise. Your health care provider may recommend that you:  · Get regular exercise.  · Make changes to your diet.  · Quit smoking if you smoke.  If diet changes and exercise do not help you reach your goals, your health care provider may also prescribe medicine to  lower lipids. The most commonly prescribed type of medicine lowers your LDL cholesterol (statin drug). If you have a high triglyceride level, your provider may prescribe another type of drug (fibrate) or an omega-3 fish oil supplement, or both.  Follow these instructions at home:  · Take over-the-counter and prescription medicines only as told by your health care provider. This includes supplements.  · Get regular exercise. Start an aerobic exercise and strength training program as told by your health care provider. Ask your health care provider what activities are safe for you. Your health care provider may recommend:  ¨ 30 minutes of aerobic activity 4-6 days a week. Brisk walking is an example of aerobic activity.  ¨ Strength training 2 days a week.  · Eat a healthy diet as told by your health care provider. This can help you reach and maintain a healthy weight, lower your LDL cholesterol, and raise your HDL cholesterol. It may help to work with a diet and nutrition specialist (dietitian) to make a plan that is right for you. Your dietitian or health care provider may recommend:  ¨ Limiting your calories, if you are overweight.  ¨ Eating more fruits, vegetables, whole grains, fish, and lean meats.  ¨ Limiting saturated fat, trans fat, and cholesterol.  · Follow instructions from your health care provider or dietitian about eating or drinking restrictions.  · Limit alcohol intake to no more than one drink per day for nonpregnant women and two drinks per day for men. One drink equals 12 oz of beer, 5 oz of wine, or 1½ oz of hard liquor.  · Do not use any products that contain nicotine or tobacco, such as cigarettes and e-cigarettes. If you need help quitting, ask your health care provider.  · Keep all follow-up visits as told by your health care provider. This is important.  Contact a health care provider if:  · You are having trouble sticking to your exercise or diet plan.  · You are struggling to quit smoking or  control your use of alcohol.  Summary  · Dyslipidemia is an imbalance of waxy, fat-like substances (lipids) in the blood. The body needs lipids in small amounts. Dyslipidemia often involves a high level of cholesterol or triglycerides, which are types of lipids.  · Treatment depends on the type of dyslipidemia that you have and your other risk factors for heart disease and stroke.  · For many people, treatment starts with lifestyle changes, such as diet and exercise. Your health care provider may also prescribe medicine to lower lipids.  This information is not intended to replace advice given to you by your health care provider. Make sure you discuss any questions you have with your health care provider.    Introduction  Your blood pressure on this visit to the emergency department or clinic is elevated. This does not necessarily mean you have high blood pressure (hypertension), but it does mean that your blood pressure needs to be rechecked. Many times your blood pressure can increase due to illness, pain, anxiety, or other factors.  We recommend that you get a series of blood pressure readings done over a period of 5 days. It is best to get a reading in the morning and one in the evening. You should make sure to sit and relax for 1-5 minutes before the reading is taken. Write the readings down and make a follow-up appointment with your health care provider to discuss the results. If there is not a free clinic or a drug store with a blood-pressure-taking machine near you, you can purchase blood-pressure-taking equipment from a drug store. Having one in the home allows you the convenience of taking your blood pressure while you are home and relaxed.  BLOOD PRESSURE LOG   Date: _______________________  · a.m. _____________________  · p.m. _____________________  Date: _______________________  · a.m. _____________________  · p.m. _____________________  Date: _______________________  · a.m.  _____________________  · p.m. _____________________  Date: _______________________  · a.m. _____________________  · p.m. _____________________  Date: _______________________  · a.m. _____________________  · p.m. _____________________  This information is not intended to replace advice given to you by your health care provider. Make sure you discuss any questions you have with your health care provider.

## 2019-04-17 NOTE — PROGRESS NOTES
cc: Hypertension, vitamin D deficiency, dyslipidemia, BMI 44.    Subjective:     Oswald Schuler is a 38 y.o. male presenting complete blood count was within normal limits with mild borderline increase of hemoglobin and hematocrit at 18.1 and 54.1, kidney GFR was within normal limits, complete metabolic panel within normal limits, A1c was 5.6, TSH thyroid was within normal limits, lipid was high with LDL at 146 and total cholesterol at 219, vitamin D was low at 12, vitamin B12 was normal at 596, STD panel of hepatitis, HIV, syphilis and gonorrhea and chlamydia was negative.  Compared to February 21 the weight has gone from 316 pounds to 325 pounds.  Still taking 5 mg of amlodipine and started the new medication of hydrochlorothiazide 12.5 mg and lisinopril 5 mg.  He reports his been checking his blood pressure in both arms and they have been around average 140s over 90s and lowest couple of 120s over 70s and the highest has been 170s over 100.  He reports he drinks 1-3 cups of coffee and no energy drinks.  And he has not having any alcohol.  He reports his work has been busy so he has not been able to do his sleep study or allergy referral yet.  He has been wearing some compression stockings which is been helping with the heel. Never got chance for roller rubs or yoga yet. Trying to change diet.     Review of systems:     Constitutional: Negative for fever, chills and negative fatigue.   HENT: Negative for sinus pressure  Eyes: Negative for blurriness, negative for double vision  Respiratory: Negative for cough and shortness of breath, negative for exertional shortness of breath  Cardiovascular: Negative for leg swelling, negative for palpitations, negative for chest pain  Neurological: Negative for dizziness, focal weakness and headaches.   Endo/Heme/Allergies: Denies bleeding, bruising, and recurrent allergies.  Psychiatric/Behavioral: Negative for depression and anxiety.        Current Outpatient  Prescriptions:   •  hydroCHLOROthiazide (HYDRODIURIL) 25 MG Tab, Take 1 Tab by mouth every day., Disp: 90 Tab, Rfl: 0  •  lisinopril (PRINIVIL) 5 MG Tab, Take 1 Tab by mouth every day., Disp: 90 Tab, Rfl: 0  •  amLODIPine (NORVASC) 5 MG Tab, Take 1 Tab by mouth every day., Disp: 90 Tab, Rfl: 0  •  ergocalciferol (DRISDOL) 66068 UNIT capsule, Take one tab po once a week., Disp: 12 Cap, Rfl: 0  •  Multiple Vitamin (MULTI VITAMIN DAILY PO), Take  by mouth., Disp: , Rfl:     Allergies, past medical history, past surgical history, family history, social history reviewed and updated    Objective:     Vitals: /86 (BP Location: Left arm, Patient Position: Sitting, BP Cuff Size: Adult)   Pulse 86   Temp 37.1 °C (98.8 °F) (Temporal)   Resp 14   Ht 1.829 m (6')   Wt (!) 147.4 kg (325 lb)   SpO2 97%   BMI 44.08 kg/m²   General: Alert, pleasant, NAD  HEENT: Normocephalic.  Nontraumatic. EOMI, no icterus or pallor.  Conjunctivae and lids normal. External ears normal.   Heart: Regular rate and rhythm.  S1 and S2 normal.  No murmurs appreciated.  Respiratory: Normal respiratory effort.  Clear to auscultation bilaterally.  Musculoskeletal: Gait is normal.  Moves all extremities well.  Extremities: Mild pitting edema 1+ to his upper ankles bilateral.  Pedal pulses 2+ symmetric.   Psych:  Affect/mood is normal, judgement is good, memory is intact, grooming is appropriate.    Assessment/Plan:     Diagnoses and all orders for this visit:    Essential hypertension  -     hydroCHLOROthiazide (HYDRODIURIL) 25 MG Tab; Take 1 Tab by mouth every day.  -     lisinopril (PRINIVIL) 5 MG Tab; Take 1 Tab by mouth every day.  -     amLODIPine (NORVASC) 5 MG Tab; Take 1 Tab by mouth every day.    Vitamin D deficiency  -     ergocalciferol (DRISDOL) 47786 UNIT capsule; Take one tab po once a week.    Dyslipidemia    BMI 40.0-44.9, adult (HCC)  -     Patient identified as having weight management issue.  Appropriate orders and counseling  given.    Medication monitoring encounter    Leg swelling    -complete blood count was within normal limits with mild borderline increase of hemoglobin and hematocrit at 18.1 and 54.1, kidney GFR was within normal limits, complete metabolic panel within normal limits, A1c was 5.6, TSH thyroid was within normal limits, lipid was high with LDL at 146 and total cholesterol at 219, vitamin D was low at 12, vitamin B12 was normal at 596, STD panel of hepatitis, HIV, syphilis and gonorrhea and chlamydia was negative.  Went over lab work as above will start high-dose vitamin D 50,000 units once a week for 3 months will continue amlodipine 5 mg daily and lisinopril 5 mg daily and increase his hydrochlorothiazide to 25 mg daily and will recheck his vitamin D and CMP in 3 months and lipids in 6 months.  Recommend to start a low-dose statin medication but he wants to work on his diet and exercise and will recommend to do over-the-counter fish oil omega threes 2 times a day with meals and can also try red yeast over-the-counter once a day to help with his cholesterol.  We discussed that due to this and increased chance of heart attack and stroke to work on his diet.  Dietary referral offered but he wants to wait and work on this.  Would recommend try a combination of keto/Mediterranean/Ayurvedic diet try some apps like lose it jami for weight watchers if he wants.  Please try to do some of the yoga stretching and exercises to avoid any degeneration of the osteoarthritis and tennis ball roller rubs for his foot and icing and continue wearing compression stockings.  Please go and get sleep study done and allergy referral appointment and paper given for number to call.  ER precautions given if any chest pain, shortness of breath, problems breathing, dizziness and please go to the ER or call 911. 3-month of medication given for the patient and please continue to do blood pressure logs at least 3 times a week sometimes morning,  afternoon, evening the top number and the bottom number and heart rate and bring this to your next appointment.  Continue to wear compression stockings and elevate legs and reduce salt in diet and encouraged him to see a dietitian and work on his weight and he reports he will work on his weight but he does not want to see a dietitian right now and is going to try the keto diet.  He reports he has not had any cigarette smoking and he only did that socially when he drank.      Return in about 7 weeks (around 6/4/2019), or High blood pressure/BMI, for Long, 40 min appnt.

## 2019-04-29 LAB — EKG IMPRESSION: NORMAL

## 2019-05-01 ENCOUNTER — OFFICE VISIT (OUTPATIENT)
Dept: URGENT CARE | Facility: PHYSICIAN GROUP | Age: 39
End: 2019-05-01
Payer: COMMERCIAL

## 2019-05-01 VITALS
DIASTOLIC BLOOD PRESSURE: 78 MMHG | SYSTOLIC BLOOD PRESSURE: 132 MMHG | WEIGHT: 315 LBS | HEIGHT: 73 IN | RESPIRATION RATE: 12 BRPM | TEMPERATURE: 98 F | OXYGEN SATURATION: 97 % | BODY MASS INDEX: 41.75 KG/M2 | HEART RATE: 85 BPM

## 2019-05-01 DIAGNOSIS — K08.9 DENTAL DISORDER: ICD-10-CM

## 2019-05-01 PROCEDURE — 99214 OFFICE O/P EST MOD 30 MIN: CPT | Performed by: PHYSICIAN ASSISTANT

## 2019-05-01 RX ORDER — AMOXICILLIN 875 MG/1
875 TABLET, COATED ORAL 2 TIMES DAILY
Qty: 20 TAB | Refills: 0 | Status: SHIPPED | OUTPATIENT
Start: 2019-05-01 | End: 2019-05-11

## 2019-05-01 ASSESSMENT — ENCOUNTER SYMPTOMS
ABDOMINAL PAIN: 0
COUGH: 0
CHILLS: 0
SORE THROAT: 1
NAUSEA: 0
SINUS PRESSURE: 1
VOMITING: 0
FEVER: 0
MYALGIAS: 0

## 2019-05-01 NOTE — PROGRESS NOTES
"Subjective:      Oswald Schuler is a 38 y.o. male who presents with Dental Pain (pain in Left molar x 2 days)            Dental Problems    This is a new problem. The current episode started in the past 7 days. The problem occurs constantly. The problem has been unchanged. The pain is at a severity of 5/10. Associated symptoms include facial pain, sinus pressure and thermal sensitivity. Pertinent negatives include no difficulty swallowing, fever or oral bleeding. He has tried nothing for the symptoms. The treatment provided no relief.   Patient reports seeing a dentist recently and having x-rays done.  Where there is discomfort is where there is a crown.  He was told that he may have problems with getting an abscess within that crown because of the way the x-rays showed.  Patient denies fever but reports left cheek pain and pain in that dental region      Past medical history: Is not pertinent to this examination  Past surgical history: Not pertinent to this examination  Family history: Is not pertinent to this examination  Allergies: No known drug allergies  Social history: Is reviewed in Epic    Review of Systems   Constitutional: Negative for chills and fever.   HENT: Positive for dental problem, sinus pressure and sore throat.    Respiratory: Negative for cough.    Cardiovascular: Negative for chest pain.   Gastrointestinal: Negative for abdominal pain, nausea and vomiting.   Genitourinary: Positive for dysuria.   Musculoskeletal: Negative for myalgias.   Skin: Negative for rash.          Objective:     /78 (BP Location: Left arm, Patient Position: Sitting, BP Cuff Size: Adult)   Pulse 85   Temp 36.7 °C (98 °F) (Temporal)   Resp 12   Ht 1.854 m (6' 1\")   Wt (!) 146.1 kg (322 lb)   SpO2 97%   BMI 42.48 kg/m²      Physical Exam   Constitutional: He is oriented to person, place, and time. He appears well-developed and well-nourished.   HENT:   Head: Normocephalic and atraumatic.   Mouth/Throat: "       No vince abscess or fluctuant mass noted.  Some pain to palpation in this region noted.  Patient has some pain to percussion in the left maxillary sinus region as well   Eyes: Pupils are equal, round, and reactive to light. EOM are normal.   Neck: Normal range of motion.   Cardiovascular: Normal rate, regular rhythm and normal heart sounds.    Pulmonary/Chest: Effort normal and breath sounds normal.   Abdominal: Soft.   Musculoskeletal: Normal range of motion.   Neurological: He is alert and oriented to person, place, and time.   Skin: Skin is warm.   Psychiatric: He has a normal mood and affect.               Assessment/Plan:     1. Dental disorder  Do not see a vince abscess but he does have a crown in the area of discomfort.  Discussed that he needs to follow-up with an oral surgeon for further evaluation.  We will start him on high-dose amoxicillin.  Supportive care measures encouraged.  If symptoms persist or worsen despite treatment please go to the emergency room  - amoxicillin (AMOXIL) 875 MG tablet; Take 1 Tab by mouth 2 times a day for 10 days.  Dispense: 20 Tab; Refill: 0

## 2019-06-04 ENCOUNTER — OFFICE VISIT (OUTPATIENT)
Dept: MEDICAL GROUP | Facility: PHYSICIAN GROUP | Age: 39
End: 2019-06-04
Payer: COMMERCIAL

## 2019-06-04 VITALS
HEART RATE: 84 BPM | HEIGHT: 72 IN | SYSTOLIC BLOOD PRESSURE: 140 MMHG | WEIGHT: 315 LBS | DIASTOLIC BLOOD PRESSURE: 90 MMHG | OXYGEN SATURATION: 95 % | BODY MASS INDEX: 42.66 KG/M2 | TEMPERATURE: 98.5 F | RESPIRATION RATE: 14 BRPM

## 2019-06-04 DIAGNOSIS — Z51.81 MEDICATION MONITORING ENCOUNTER: ICD-10-CM

## 2019-06-04 DIAGNOSIS — I10 ESSENTIAL HYPERTENSION: ICD-10-CM

## 2019-06-04 DIAGNOSIS — E55.9 VITAMIN D DEFICIENCY: ICD-10-CM

## 2019-06-04 DIAGNOSIS — E78.5 DYSLIPIDEMIA: ICD-10-CM

## 2019-06-04 DIAGNOSIS — M79.89 LEG SWELLING: ICD-10-CM

## 2019-06-04 PROCEDURE — 99214 OFFICE O/P EST MOD 30 MIN: CPT | Performed by: FAMILY MEDICINE

## 2019-06-04 RX ORDER — ERGOCALCIFEROL 1.25 MG/1
CAPSULE ORAL
Qty: 12 CAP | Refills: 0 | Status: SHIPPED | OUTPATIENT
Start: 2019-06-04 | End: 2019-11-14 | Stop reason: SDUPTHER

## 2019-06-04 RX ORDER — HYDROCHLOROTHIAZIDE 25 MG/1
25 TABLET ORAL DAILY
Qty: 90 TAB | Refills: 0 | Status: SHIPPED | OUTPATIENT
Start: 2019-06-04 | End: 2019-08-29 | Stop reason: SDUPTHER

## 2019-06-04 RX ORDER — LISINOPRIL 10 MG/1
10 TABLET ORAL DAILY
Qty: 90 TAB | Refills: 0 | Status: SHIPPED | OUTPATIENT
Start: 2019-06-04 | End: 2019-08-29

## 2019-06-04 RX ORDER — AMLODIPINE BESYLATE 5 MG/1
5 TABLET ORAL DAILY
Qty: 90 TAB | Refills: 0 | Status: SHIPPED | OUTPATIENT
Start: 2019-06-04 | End: 2019-08-29 | Stop reason: SDUPTHER

## 2019-06-04 RX ORDER — DIMENHYDRINATE 50 MG
TABLET ORAL
COMMUNITY

## 2019-06-04 NOTE — PROGRESS NOTES
cc: BMI 43, hypertension, leg swelling    Subjective:     Oswald Schuler is a 38 y.o. male presenting Wearing compression stockings, and reports leg swelling has improved bilaterally and he is wearing compression stockings.  He has been working nights and 12-hour shifts and is trying to meal plan now and has more chicken and is Insta pot and planning meals.  Weight has neither increased or decreased at this visit.  He was last seen April 17, 2019 where he went over his lab work.  He has been taking the high-dose 50,000 units vitamin D once a week.  Currently for his blood pressure he is taking amlodipine 5 mg daily, lisinopril 5 mg daily, hydrochlorothiazide 25 mg daily.  Amlodipine 5 mg he is taking at night and hydrochlorothiazide 25 mg at night and lisinopril 5 mg in the morning.  His blood pressure ranges have been  124-154/70s-90s and his heart rate is 70s to 90s and average is high 130s over 80s.  He is taking omega-3 fatty acids twice a day and red yeast once a day and he would like to hold off taking any cholesterol medication even though is recommended due to his blood pressure.  He reports he may be having increased protein and will eventually try and jami to help with his gym and caloric intake.  He has information for sleep study and allergy referral he has just been busy with work and so he will call them later to make an appointment.   Review of systems:     Constitutional: Negative for fever, chills  HENT: Negative for sinus pressure  Eyes: Negative for blurriness, negative for double vision  Respiratory: Negative for cough and shortness of breath, negative for exertional shortness of breath   Cardiovascular: Negative for leg swelling, negative for palpitations, negative for chest pain  Gastrointestinal: Negative for nausea, vomiting, abdominal pain,   Neurological: Negative for dizziness, focal weakness and headaches.   Endo/Heme/Allergies: Denies bleeding, bruising, and recurrent  allergies.  Psychiatric/Behavioral: Negative for depression and anxiety.      Current Outpatient Prescriptions:   •  Coenzyme Q10 (CO Q-10) 100 MG Cap, Take  by mouth., Disp: , Rfl:   •  ergocalciferol (DRISDOL) 78264 UNIT capsule, Take one tab po once a week., Disp: 12 Cap, Rfl: 0  •  lisinopril (PRINIVIL) 10 MG Tab, Take 1 Tab by mouth every day., Disp: 90 Tab, Rfl: 0  •  hydroCHLOROthiazide (HYDRODIURIL) 25 MG Tab, Take 1 Tab by mouth every day., Disp: 90 Tab, Rfl: 0  •  amLODIPine (NORVASC) 5 MG Tab, Take 1 Tab by mouth every day., Disp: 90 Tab, Rfl: 0  •  Multiple Vitamin (MULTI VITAMIN DAILY PO), Take  by mouth., Disp: , Rfl:     Allergies, past medical history, past surgical history, family history, social history reviewed and updated    Objective:     Vitals: /90 (BP Location: Left arm, Patient Position: Sitting, BP Cuff Size: Adult long)   Pulse 84   Temp 36.9 °C (98.5 °F) (Temporal)   Resp 14   Ht 1.829 m (6')   Wt (!) 147 kg (324 lb)   SpO2 95%   BMI 43.94 kg/m²   General: Alert, pleasant, NAD  HEENT: Normocephalic.  Nontraumatic. EOMI, no icterus or pallor.  Conjunctivae and lids normal. External ears normal.  Heart: Regular rate and rhythm.  S1 and S2 normal.  No murmurs appreciated.  Respiratory: Normal respiratory effort.  Clear to auscultation bilaterally.  Skin: Warm, dry, no rashes.  Musculoskeletal: Gait is normal.  Moves all extremities well.  Extremities: No leg edema.   Psych:  Affect/mood is normal, judgement is good, memory is intact, grooming is appropriate.    Assessment/Plan:     Diagnoses and all orders for this visit:    Vitamin D deficiency  -     ergocalciferol (DRISDOL) 97172 UNIT capsule; Take one tab po once a week.  -     VITAMIN D,25 HYDROXY; Future    Leg swelling    Dyslipidemia    Essential hypertension  -     lisinopril (PRINIVIL) 10 MG Tab; Take 1 Tab by mouth every day.  -     hydroCHLOROthiazide (HYDRODIURIL) 25 MG Tab; Take 1 Tab by mouth every day.  -      amLODIPine (NORVASC) 5 MG Tab; Take 1 Tab by mouth every day.  -     Comp Metabolic Panel; Future    Medication monitoring encounter  -     Comp Metabolic Panel; Future    -About 1 week before your follow-up please get nonfasting lab work of CMP and vitamin D and continue to take refill of high-dose 50,000 units vitamin D once a week, and continue to take amlodipine 5 mg daily and hydrochlorothiazide 25 mg daily and will also increase his lisinopril from 5 mg daily to 10 mg daily as he would rather do this then take a beta-blocker.  Blood pressure can take 4 to 6 weeks to recalibrate so continue to take the blood pressure the top number and the bottom number and heart rate about 3 times a week and bring this at your next appointment and for blood pressure goal of 120s over 70s and we will see what his heart rate is doing and he wants to avoid beta-blocker for now.  He also wants to avoid any cholesterol medication for now and will continue to do his omega-3 fatty acids and red yeast and would recommend it twice a day with meals.  We will recheck his cholesterol in 6 months.  He is going to get back onto an jami for fitness and diet planning and increase fiber and vegetables and still do protein but have a good balance with the fiber and vegetables.  We talked about reducing the salt which she does not do too much and he does not smoke or drink alcohol.  He will continue compression stockings for his legs as this helps and icing and elevating his foot and wearing inserts to avoid any plantar fasciitis or arthritis or degeneration from where he had his injury in the past to his toe.  He is otherwise doing well in ER precautions given if any chest pain, shortness of breath, passing out then please go to the ER or call 911.  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    Return in about 6 weeks (around 7/16/2019), or Weight/BP/labs, for  Long, 40 min appnt, High blood pressure.

## 2019-06-04 NOTE — PATIENT INSTRUCTIONS
Diagnoses and all orders for this visit:    Vitamin D deficiency  -     ergocalciferol (DRISDOL) 65218 UNIT capsule; Take one tab po once a week.  -     VITAMIN D,25 HYDROXY; Future    Leg swelling    Dyslipidemia    Essential hypertension  -     lisinopril (PRINIVIL) 10 MG Tab; Take 1 Tab by mouth every day.  -     hydroCHLOROthiazide (HYDRODIURIL) 25 MG Tab; Take 1 Tab by mouth every day.  -     amLODIPine (NORVASC) 5 MG Tab; Take 1 Tab by mouth every day.  -     Comp Metabolic Panel; Future    Medication monitoring encounter  -     Comp Metabolic Panel; Future    -About 1 week before your follow-up please get nonfasting lab work of CMP and vitamin D and continue to take refill of high-dose 50,000 units vitamin D once a week, and continue to take amlodipine 5 mg daily and hydrochlorothiazide 25 mg daily and will also increase his lisinopril from 5 mg daily to 10 mg daily as he would rather do this then take a beta-blocker.  Blood pressure can take 4 to 6 weeks to recalibrate so continue to take the blood pressure the top number and the bottom number and heart rate about 3 times a week and bring this at your next appointment and for blood pressure goal of 120s over 70s and we will see what his heart rate is doing and he wants to avoid beta-blocker for now.  He also wants to avoid any cholesterol medication for now and will continue to do his omega-3 fatty acids and red yeast and would recommend it twice a day with meals.  We will recheck his cholesterol in 6 months.  He is going to get back onto an jami for fitness and diet planning and increase fiber and vegetables and still do protein but have a good balance with the fiber and vegetables.  We talked about reducing the salt which she does not do too much and he does not smoke or drink alcohol.  He will continue compression stockings for his legs as this helps and icing and elevating his foot and wearing inserts to avoid any plantar fasciitis or arthritis or  degeneration from where he had his injury in the past to his toe.  He is otherwise doing well in ER precautions given if any chest pain, shortness of breath, passing out then please go to the ER or call 911.    Return in about 6 weeks (around 7/16/2019), or Weight/BP/labs, for Long, 40 min appnt, High blood pressure.    Introduction  Your blood pressure on this visit to the emergency department or clinic is elevated. This does not necessarily mean you have high blood pressure (hypertension), but it does mean that your blood pressure needs to be rechecked. Many times your blood pressure can increase due to illness, pain, anxiety, or other factors.  We recommend that you get a series of blood pressure readings done over a period of 5 days. It is best to get a reading in the morning and one in the evening. You should make sure to sit and relax for 1-5 minutes before the reading is taken. Write the readings down and make a follow-up appointment with your health care provider to discuss the results. If there is not a free clinic or a drug store with a blood-pressure-taking machine near you, you can purchase blood-pressure-taking equipment from a drug store. Having one in the home allows you the convenience of taking your blood pressure while you are home and relaxed.  BLOOD PRESSURE LOG   Date: _______________________  · a.m. _____________________  · p.m. _____________________  Date: _______________________  · a.m. _____________________  · p.m. _____________________  Date: _______________________  · a.m. _____________________  · p.m. _____________________  Date: _______________________  · a.m. _____________________  · p.m. _____________________  Date: _______________________  · a.m. _____________________  · p.m. _____________________  This information is not intended to replace advice given to you by your health care provider. Make sure you discuss any questions you have with your health care provider.  Document  Released: 09/15/2004 Document Revised: 07/07/2017 Document Reviewed: 02/10/2015  © 2017 [employer]

## 2019-08-29 ENCOUNTER — OFFICE VISIT (OUTPATIENT)
Dept: MEDICAL GROUP | Facility: PHYSICIAN GROUP | Age: 39
End: 2019-08-29
Payer: COMMERCIAL

## 2019-08-29 VITALS
HEART RATE: 96 BPM | OXYGEN SATURATION: 96 % | HEIGHT: 72 IN | BODY MASS INDEX: 42.66 KG/M2 | WEIGHT: 315 LBS | DIASTOLIC BLOOD PRESSURE: 96 MMHG | SYSTOLIC BLOOD PRESSURE: 158 MMHG | TEMPERATURE: 98.3 F | RESPIRATION RATE: 14 BRPM

## 2019-08-29 DIAGNOSIS — I10 ESSENTIAL HYPERTENSION: ICD-10-CM

## 2019-08-29 DIAGNOSIS — R53.83 FATIGUE, UNSPECIFIED TYPE: ICD-10-CM

## 2019-08-29 DIAGNOSIS — Z51.81 MEDICATION MONITORING ENCOUNTER: ICD-10-CM

## 2019-08-29 DIAGNOSIS — E78.5 DYSLIPIDEMIA: ICD-10-CM

## 2019-08-29 DIAGNOSIS — E83.42 HYPOMAGNESEMIA: ICD-10-CM

## 2019-08-29 DIAGNOSIS — R73.9 HYPERGLYCEMIA: ICD-10-CM

## 2019-08-29 DIAGNOSIS — E55.9 VITAMIN D DEFICIENCY: ICD-10-CM

## 2019-08-29 PROCEDURE — 99214 OFFICE O/P EST MOD 30 MIN: CPT | Performed by: FAMILY MEDICINE

## 2019-08-29 RX ORDER — AMLODIPINE BESYLATE 5 MG/1
5 TABLET ORAL DAILY
Qty: 90 TAB | Refills: 0 | Status: SHIPPED | OUTPATIENT
Start: 2019-08-29 | End: 2019-10-08 | Stop reason: SDUPTHER

## 2019-08-29 RX ORDER — HYDROCHLOROTHIAZIDE 25 MG/1
25 TABLET ORAL DAILY
Qty: 90 TAB | Refills: 0 | Status: SHIPPED | OUTPATIENT
Start: 2019-08-29 | End: 2019-10-08 | Stop reason: SDUPTHER

## 2019-08-29 RX ORDER — LISINOPRIL 20 MG/1
20 TABLET ORAL DAILY
Qty: 90 TAB | Refills: 0 | Status: SHIPPED | OUTPATIENT
Start: 2019-08-29 | End: 2019-10-08 | Stop reason: SDUPTHER

## 2019-08-29 ASSESSMENT — PATIENT HEALTH QUESTIONNAIRE - PHQ9: CLINICAL INTERPRETATION OF PHQ2 SCORE: 0

## 2019-08-29 NOTE — PATIENT INSTRUCTIONS
Diagnoses and all orders for this visit:    Essential hypertension  -     amLODIPine (NORVASC) 5 MG Tab; Take 1 Tab by mouth every day.  -     hydroCHLOROthiazide (HYDRODIURIL) 25 MG Tab; Take 1 Tab by mouth every day.  -     Comp Metabolic Panel; Future    BMI 40.0-44.9, adult (HCC)    Medication monitoring encounter  -     CBC WITH DIFFERENTIAL; Future  -     Comp Metabolic Panel; Future    Vitamin D deficiency  -     VITAMIN D,25 HYDROXY; Future    Hyperglycemia  -     HEMOGLOBIN A1C; Future    Dyslipidemia  -     Lipid Profile; Future    Fatigue, unspecified type  -     CBC WITH DIFFERENTIAL; Future    Hypomagnesemia  -     MAGNESIUM; Future    Other orders  -     lisinopril (PRINIVIL) 20 MG Tab; Take 1 Tab by mouth every day.    -At least 1 week before you see me in 8 weeks please get fasting lab work 8 hours of no eating but drink plenty of water and we will get a CBC, CMP A1c, lipid, magnesium and vitamin D.  New medication change may increase lisinopril from 10 mg to 20 mg so he will double his dose and then start taking 20 mg.  Continue hydrochlorothiazide 25 mg and amlodipine 5 mg and we will recheck his potassium and sodium while he is on this diuretic before his next visit.  Continue vitamin D high-dose once a week and your other multivitamins and fish oil and we will recheck your lipids as he wanted to avoid cholesterol medication.  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  He is aware of ER precautions if any chest pain, shortness of breath, passing out, dizziness then go to the ER call 911 otherwise we will see him back in 8 weeks and please check your blood pressure about twice a week the systolic and diastolic and heart rate in both arms and we will see what the new medication is doing his blood pressure and  ideal blood pressures 120s over 80s or little lower if possible we will continue to work towards the goal and make the necessary adjustments and he will continue to wear compression stockings when he is on his feet for a long time to help promote blood flow.    Return in about 8 weeks (around 10/24/2019), or BP/Hep B Vaccine/Lab FU/BMI.

## 2019-08-29 NOTE — PROGRESS NOTES
cc: Hypertension, BMI 44, vitamin D deficiency, dyslipidemia    Subjective:     Oswald Schuler is a 39 y.o. male presenting he works in the ER and works night shifts and so he is taking his blood pressure medication with lisinopril 10 mg for his blood pressure in the morning at 7 AM and his amlodipine 5 mg, and hydrochlorothiazide 25 mg in the evening at 7 PM.  He is been checking his blood pressures once to twice a week.  The highest blood pressure was 160s over 100 which was today and on recheck by myself was 158/96.  His average is 140s over 80s or 90s.  He has had a few in the 150s as well.  He is not having any heart symptoms of chest pain, racing of the heart, problems breathing, dizziness.  He wears his compression stockings and no swelling in his legs except when he standing on his feet a long time in the ER.  He remembers to take his blood pressure medication daily.  No changes of vision.  Last time he was seen was June 2019 and his blood pressure was 140/90 then his weight was 324 pounds and today's 331 pound.  He has been working a lot recently and is hoping to have some more time to take care of himself soon.  He was really busy so he was not able to do his lab work but he will get this done before his next visit.  In June 2019 at his last visit he was taking amlodipine 5 mg and hydrochlorothiazide 25 mg and we increase his lisinopril from 5 to 10 mg daily.  For his cholesterol he is doing over-the-counter right now.  His heart rate in his feet but is normally in the 80s.    Review of systems:     Constitutional: Negative for fever, chills and negative fatigue.   HENT: Negative for sinus pressure  Eyes: Negative for blurriness, negative for double vision  Respiratory: Negative for cough and shortness of breath, negative for exertional shortness of breath  Cardiovascular: Negative for leg swelling, negative for palpitations, negative for chest pain  Gastrointestinal: Negative for nausea, vomiting,  abdominal pain  Genitourinary: Negative for dysuria and hematuria.   Skin: Negative for rash.   Neurological: Negative for dizziness, focal weakness and headaches.   Endo/Heme/Allergies: Denies bleeding, bruising, and recurrent allergies.  Psychiatric/Behavioral: Negative for depression and anxiety.      Current Outpatient Medications:   •  amLODIPine (NORVASC) 5 MG Tab, Take 1 Tab by mouth every day., Disp: 90 Tab, Rfl: 0  •  hydroCHLOROthiazide (HYDRODIURIL) 25 MG Tab, Take 1 Tab by mouth every day., Disp: 90 Tab, Rfl: 0  •  lisinopril (PRINIVIL) 20 MG Tab, Take 1 Tab by mouth every day., Disp: 90 Tab, Rfl: 0  •  Coenzyme Q10 (CO Q-10) 100 MG Cap, Take  by mouth., Disp: , Rfl:   •  ergocalciferol (DRISDOL) 16622 UNIT capsule, Take one tab po once a week., Disp: 12 Cap, Rfl: 0  •  Multiple Vitamin (MULTI VITAMIN DAILY PO), Take  by mouth., Disp: , Rfl:     Allergies, past medical history, past surgical history, family history, social history reviewed and updated    Objective:     Vitals: /96 (BP Location: Left arm, Patient Position: Sitting, BP Cuff Size: Adult)   Pulse 96   Temp 36.8 °C (98.3 °F) (Temporal)   Resp 14   Ht 1.829 m (6')   Wt (!) 150.4 kg (331 lb 9.6 oz)   SpO2 96%   BMI 44.97 kg/m²   General: Alert, pleasant, NAD  HEENT: Normocephalic.  Nontraumatic. EOMI, no icterus or pallor.  Conjunctivae and lids normal. External ears normal.   Heart: Regular rate and rhythm.  S1 and S2 normal.  No murmurs appreciated.  Respiratory: Normal respiratory effort.  Clear to auscultation bilaterally.  Skin: Warm, dry, no rashes.  Musculoskeletal: Gait is normal.  Moves all extremities well.  Extremities: No leg edema.   Psych:  Affect/mood is normal, judgement is good, memory is intact, grooming is appropriate.    Assessment/Plan:     Diagnoses and all orders for this visit:    Essential hypertension  -     amLODIPine (NORVASC) 5 MG Tab; Take 1 Tab by mouth every day.  -     hydroCHLOROthiazide  (HYDRODIURIL) 25 MG Tab; Take 1 Tab by mouth every day.  -     Comp Metabolic Panel; Future    BMI 40.0-44.9, adult (HCC)    Medication monitoring encounter  -     CBC WITH DIFFERENTIAL; Future  -     Comp Metabolic Panel; Future    Vitamin D deficiency  -     VITAMIN D,25 HYDROXY; Future    Hyperglycemia  -     HEMOGLOBIN A1C; Future    Dyslipidemia  -     Lipid Profile; Future    Fatigue, unspecified type  -     CBC WITH DIFFERENTIAL; Future    Hypomagnesemia  -     MAGNESIUM; Future    Other orders  -     lisinopril (PRINIVIL) 20 MG Tab; Take 1 Tab by mouth every day.    -At least 1 week before you see me in 8 weeks please get fasting lab work 8 hours of no eating but drink plenty of water and we will get a CBC, CMP A1c, lipid, magnesium and vitamin D.  New medication change may increase lisinopril from 10 mg to 20 mg so he will double his dose and then start taking 20 mg.  Continue hydrochlorothiazide 25 mg and amlodipine 5 mg and we will recheck his potassium and sodium while he is on this diuretic before his next visit.  Continue vitamin D high-dose once a week and your other multivitamins and fish oil and we will recheck your lipids as he wanted to avoid cholesterol medication.  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  He is aware of ER precautions if any chest pain, shortness of breath, passing out, dizziness then go to the ER call 911 otherwise we will see him back in 8 weeks and please check your blood pressure about twice a week the systolic and diastolic and heart rate in both arms and we will see what the new medication is doing his blood pressure and ideal blood pressures 120s over 80s or little lower if possible we will continue to work towards the goal and make the necessary adjustments and he will continue to  wear compression stockings when he is on his feet for a long time to help promote blood flow.    Return in about 8 weeks (around 10/24/2019), or BP/Hep B Vaccine/Lab FU/BMI.

## 2019-10-08 ENCOUNTER — OFFICE VISIT (OUTPATIENT)
Dept: MEDICAL GROUP | Facility: PHYSICIAN GROUP | Age: 39
End: 2019-10-08
Payer: COMMERCIAL

## 2019-10-08 VITALS
TEMPERATURE: 98.2 F | BODY MASS INDEX: 42.66 KG/M2 | DIASTOLIC BLOOD PRESSURE: 80 MMHG | SYSTOLIC BLOOD PRESSURE: 130 MMHG | WEIGHT: 315 LBS | RESPIRATION RATE: 16 BRPM | HEART RATE: 96 BPM | OXYGEN SATURATION: 96 % | HEIGHT: 72 IN

## 2019-10-08 DIAGNOSIS — M54.50 ACUTE RIGHT-SIDED LOW BACK PAIN WITHOUT SCIATICA: ICD-10-CM

## 2019-10-08 DIAGNOSIS — Z51.81 MEDICATION MONITORING ENCOUNTER: ICD-10-CM

## 2019-10-08 DIAGNOSIS — R01.1 CARDIAC MURMUR: ICD-10-CM

## 2019-10-08 DIAGNOSIS — I10 ESSENTIAL HYPERTENSION: ICD-10-CM

## 2019-10-08 PROBLEM — R53.83 FATIGUE: Status: RESOLVED | Noted: 2019-02-21 | Resolved: 2019-10-08

## 2019-10-08 PROCEDURE — 99214 OFFICE O/P EST MOD 30 MIN: CPT | Performed by: FAMILY MEDICINE

## 2019-10-08 RX ORDER — LISINOPRIL 20 MG/1
20 TABLET ORAL DAILY
Qty: 90 TAB | Refills: 0 | Status: SHIPPED | OUTPATIENT
Start: 2019-10-08 | End: 2019-11-14 | Stop reason: SDUPTHER

## 2019-10-08 RX ORDER — HYDROCHLOROTHIAZIDE 25 MG/1
25 TABLET ORAL DAILY
Qty: 90 TAB | Refills: 0 | Status: SHIPPED | OUTPATIENT
Start: 2019-10-08 | End: 2019-11-14 | Stop reason: SDUPTHER

## 2019-10-08 RX ORDER — AMLODIPINE BESYLATE 5 MG/1
5 TABLET ORAL DAILY
Qty: 90 TAB | Refills: 0 | Status: SHIPPED | OUTPATIENT
Start: 2019-10-08 | End: 2019-11-14 | Stop reason: SDUPTHER

## 2019-10-08 NOTE — PROGRESS NOTES
cc: Hypertension improving, BMI 43 with weight loss and improving BMI    Subjective:     Oswald Schuler is a 39 y.o. male presenting his average blood pressure has been 120s systolic to 130s systolic over 70s to 80s diastolic.  He is still working the night ER shifts.  His blood pressure has not gone too low or too high.  August 29 when I last saw him his weight was 331 pounds and it is now 319 pounds.  No chest pain or problems breathing.  About 2 weeks ago he is been having some right lower back CVA pain.  He drinks enough water.  No hematuria.  He has not had kidney stones or gallstones before and he is peeing okay without any pain.  He is not constipated.  He has been on the keto diet before but not doing too much fat.  Most of his fat is from avocado.  Does not do too much red meat.    Review of systems:     Constitutional: Negative for fever, chills and negative fatigue.   HENT: Negative for sinus pressure  Eyes: Negative for blurriness, negative for double vision  Respiratory: Negative for cough and shortness of breath, negative for exertional shortness of breath  Cardiovascular: Negative for leg swelling, negative for palpitations, negative for chest pain  Gastrointestinal: Negative for nausea, vomiting, abdominal pain, constipation and diarrhea.  Genitourinary: Negative for dysuria and hematuria.   Neurological: Negative for dizziness, focal weakness and headaches.   Endo/Heme/Allergies: Denies bleeding, bruising, and recurrent allergies      Current Outpatient Medications:   •  amLODIPine (NORVASC) 5 MG Tab, Take 1 Tab by mouth every day., Disp: 90 Tab, Rfl: 0  •  hydroCHLOROthiazide (HYDRODIURIL) 25 MG Tab, Take 1 Tab by mouth every day., Disp: 90 Tab, Rfl: 0  •  lisinopril (PRINIVIL) 20 MG Tab, Take 1 Tab by mouth every day., Disp: 90 Tab, Rfl: 0  •  Coenzyme Q10 (CO Q-10) 100 MG Cap, Take  by mouth., Disp: , Rfl:   •  ergocalciferol (DRISDOL) 13860 UNIT capsule, Take one tab po once a week.,  Disp: 12 Cap, Rfl: 0  •  Multiple Vitamin (MULTI VITAMIN DAILY PO), Take  by mouth., Disp: , Rfl:     Allergies, past medical history, past surgical history, family history, social history reviewed and updated    Objective:     Vitals: /80 (BP Location: Left arm, Patient Position: Sitting, BP Cuff Size: Adult)   Pulse 96   Temp 36.8 °C (98.2 °F) (Temporal)   Resp 16   Ht 1.829 m (6')   Wt (!) 144.7 kg (319 lb)   SpO2 96%   BMI 43.26 kg/m²   General: Alert, pleasant, NAD  HEENT: Normocephalic.  Nontraumatic. EOMI, no icterus or pallor.  Conjunctivae and lids normal. External ears normal.   Heart: Regular rate and rhythm.  S1 and S2 normal.  No murmurs appreciated.  Respiratory: Normal respiratory effort.  Clear to auscultation bilaterally.  Abdomen: Non-distended, soft, non tender in all 4 quadrants.  Negative CVA tenderness  Skin: Warm, dry, no rashes.  Musculoskeletal: Gait is normal.  Moves all extremities well.  Extremities: No leg edema.  Pedal pulses 2+ symmetric.   Psych:  Affect/mood is normal, judgement is good, memory is intact, grooming is appropriate.    Assessment/Plan:     Diagnoses and all orders for this visit:    Essential hypertension  -     US-RENAL; Future  -     amLODIPine (NORVASC) 5 MG Tab; Take 1 Tab by mouth every day.  -     hydroCHLOROthiazide (HYDRODIURIL) 25 MG Tab; Take 1 Tab by mouth every day.  -     lisinopril (PRINIVIL) 20 MG Tab; Take 1 Tab by mouth every day.    BMI 40.0-44.9, adult ([facility])    Medication monitoring encounter  -     US-RENAL; Future  -     URINALYSIS,CULTURE IF INDICATED; Future    Acute right-sided low back pain without sciatica  -     US-RENAL; Future  -     URINALYSIS,CULTURE IF INDICATED; Future    Cardiac murmur    -At least 1 week before you see me in 6 weeks please get fasting lab work 8 hours of no eating but drink plenty of water and based on this since his blood pressure is now stable we can give longer refill of his medications and do a  possible combination medication if his CMP and everything is within normal limits and we will also recheck his cholesterol as his cholesterol was high and due to his risk factors of blood pressure they do recommend a cholesterol medication.  Good job on losing the weight.  We will get a ultrasound of his kidneys and urinalysis for concerns if he did have any stones right now his pain is doing better and having no issues right now.  He does have a slight heart murmur and if we need a echo in the future we will get one but right now we will just monitor it as it is very slight.  Otherwise good job on the weight loss and getting his blood pressure stable and if his blood pressure creeps above 130s over 90s we will need to readjust her blood pressure otherwise he is doing a lot better and feeling a lot better and doing a lot better with his weight.    Return in about 6 weeks (around 11/19/2019), or lab FU/Renal US/Urinalysis.

## 2019-10-09 NOTE — PATIENT INSTRUCTIONS
Diagnoses and all orders for this visit:    Essential hypertension  -     US-RENAL; Future  -     amLODIPine (NORVASC) 5 MG Tab; Take 1 Tab by mouth every day.  -     hydroCHLOROthiazide (HYDRODIURIL) 25 MG Tab; Take 1 Tab by mouth every day.  -     lisinopril (PRINIVIL) 20 MG Tab; Take 1 Tab by mouth every day.    BMI 40.0-44.9, adult (HCC)    Medication monitoring encounter  -     US-RENAL; Future  -     URINALYSIS,CULTURE IF INDICATED; Future    Acute right-sided low back pain without sciatica  -     US-RENAL; Future  -     URINALYSIS,CULTURE IF INDICATED; Future    Cardiac murmur    -At least 1 week before you see me in 6 weeks please get fasting lab work 8 hours of no eating but drink plenty of water and based on this since his blood pressure is now stable we can give longer refill of his medications and do a possible combination medication if his CMP and everything is within normal limits and we will also recheck his cholesterol as his cholesterol was high and due to his risk factors of blood pressure they do recommend a cholesterol medication.  Good job on losing the weight.  We will get a ultrasound of his kidneys and urinalysis for concerns if he did have any stones right now his pain is doing better and having no issues right now.  He does have a slight heart murmur and if we need a echo in the future we will get one but right now we will just monitor it as it is very slight.  Otherwise good job on the weight loss and getting his blood pressure stable and if his blood pressure creeps above 130s over 90s we will need to readjust her blood pressure otherwise he is doing a lot better and feeling a lot better and doing a lot better with his weight.    Return in about 6 weeks (around 11/19/2019), or lab FU/Renal US/Urinalysis.

## 2019-10-28 ENCOUNTER — HOSPITAL ENCOUNTER (OUTPATIENT)
Dept: RADIOLOGY | Facility: MEDICAL CENTER | Age: 39
End: 2019-10-28
Attending: FAMILY MEDICINE
Payer: COMMERCIAL

## 2019-10-28 DIAGNOSIS — Z51.81 MEDICATION MONITORING ENCOUNTER: ICD-10-CM

## 2019-10-28 DIAGNOSIS — I10 ESSENTIAL HYPERTENSION: ICD-10-CM

## 2019-10-28 DIAGNOSIS — M54.50 ACUTE RIGHT-SIDED LOW BACK PAIN WITHOUT SCIATICA: ICD-10-CM

## 2019-10-28 PROCEDURE — 76775 US EXAM ABDO BACK WALL LIM: CPT

## 2019-11-14 ENCOUNTER — HOSPITAL ENCOUNTER (OUTPATIENT)
Dept: LAB | Facility: MEDICAL CENTER | Age: 39
End: 2019-11-14
Attending: FAMILY MEDICINE
Payer: COMMERCIAL

## 2019-11-14 ENCOUNTER — OFFICE VISIT (OUTPATIENT)
Dept: MEDICAL GROUP | Facility: PHYSICIAN GROUP | Age: 39
End: 2019-11-14
Payer: COMMERCIAL

## 2019-11-14 VITALS
DIASTOLIC BLOOD PRESSURE: 78 MMHG | OXYGEN SATURATION: 94 % | SYSTOLIC BLOOD PRESSURE: 120 MMHG | HEIGHT: 70 IN | TEMPERATURE: 98.9 F | HEART RATE: 94 BPM | BODY MASS INDEX: 45.1 KG/M2 | RESPIRATION RATE: 22 BRPM | WEIGHT: 315 LBS

## 2019-11-14 DIAGNOSIS — E55.9 VITAMIN D DEFICIENCY: ICD-10-CM

## 2019-11-14 DIAGNOSIS — R73.9 HYPERGLYCEMIA: ICD-10-CM

## 2019-11-14 DIAGNOSIS — I10 ESSENTIAL HYPERTENSION: ICD-10-CM

## 2019-11-14 DIAGNOSIS — K76.0 FATTY LIVER: ICD-10-CM

## 2019-11-14 DIAGNOSIS — R53.83 OTHER FATIGUE: ICD-10-CM

## 2019-11-14 DIAGNOSIS — E83.42 HYPOMAGNESEMIA: ICD-10-CM

## 2019-11-14 DIAGNOSIS — Z51.81 MEDICATION MONITORING ENCOUNTER: ICD-10-CM

## 2019-11-14 DIAGNOSIS — E78.5 DYSLIPIDEMIA: ICD-10-CM

## 2019-11-14 DIAGNOSIS — Z23 NEED FOR VACCINATION: ICD-10-CM

## 2019-11-14 LAB
25(OH)D3 SERPL-MCNC: 18 NG/ML (ref 30–100)
ALBUMIN SERPL BCP-MCNC: 4.4 G/DL (ref 3.2–4.9)
ALBUMIN/GLOB SERPL: 1.4 G/DL
ALP SERPL-CCNC: 59 U/L (ref 30–99)
ALT SERPL-CCNC: 31 U/L (ref 2–50)
ANION GAP SERPL CALC-SCNC: 12 MMOL/L (ref 0–11.9)
APPEARANCE UR: CLEAR
AST SERPL-CCNC: 26 U/L (ref 12–45)
BACTERIA #/AREA URNS HPF: NEGATIVE /HPF
BASOPHILS # BLD AUTO: 0.8 % (ref 0–1.8)
BASOPHILS # BLD: 0.07 K/UL (ref 0–0.12)
BILIRUB SERPL-MCNC: 0.4 MG/DL (ref 0.1–1.5)
BILIRUB UR QL STRIP.AUTO: NEGATIVE
BUN SERPL-MCNC: 16 MG/DL (ref 8–22)
CALCIUM SERPL-MCNC: 9.1 MG/DL (ref 8.5–10.5)
CHLORIDE SERPL-SCNC: 100 MMOL/L (ref 96–112)
CHOLEST SERPL-MCNC: 230 MG/DL (ref 100–199)
CO2 SERPL-SCNC: 24 MMOL/L (ref 20–33)
COLOR UR: YELLOW
CREAT SERPL-MCNC: 0.72 MG/DL (ref 0.5–1.4)
EOSINOPHIL # BLD AUTO: 0.21 K/UL (ref 0–0.51)
EOSINOPHIL NFR BLD: 2.4 % (ref 0–6.9)
EPI CELLS #/AREA URNS HPF: NEGATIVE /HPF
ERYTHROCYTE [DISTWIDTH] IN BLOOD BY AUTOMATED COUNT: 44 FL (ref 35.9–50)
EST. AVERAGE GLUCOSE BLD GHB EST-MCNC: 100 MG/DL
FASTING STATUS PATIENT QL REPORTED: NORMAL
GLOBULIN SER CALC-MCNC: 3.1 G/DL (ref 1.9–3.5)
GLUCOSE SERPL-MCNC: 93 MG/DL (ref 65–99)
GLUCOSE UR STRIP.AUTO-MCNC: NEGATIVE MG/DL
HBA1C MFR BLD: 5.1 % (ref 0–5.6)
HCT VFR BLD AUTO: 51.1 % (ref 42–52)
HDLC SERPL-MCNC: 49 MG/DL
HGB BLD-MCNC: 17.8 G/DL (ref 14–18)
HYALINE CASTS #/AREA URNS LPF: ABNORMAL /LPF
IMM GRANULOCYTES # BLD AUTO: 0.04 K/UL (ref 0–0.11)
IMM GRANULOCYTES NFR BLD AUTO: 0.5 % (ref 0–0.9)
KETONES UR STRIP.AUTO-MCNC: ABNORMAL MG/DL
LDLC SERPL CALC-MCNC: 120 MG/DL
LEUKOCYTE ESTERASE UR QL STRIP.AUTO: NEGATIVE
LYMPHOCYTES # BLD AUTO: 2.84 K/UL (ref 1–4.8)
LYMPHOCYTES NFR BLD: 32.6 % (ref 22–41)
MAGNESIUM SERPL-MCNC: 1.8 MG/DL (ref 1.5–2.5)
MCH RBC QN AUTO: 31.2 PG (ref 27–33)
MCHC RBC AUTO-ENTMCNC: 34.8 G/DL (ref 33.7–35.3)
MCV RBC AUTO: 89.5 FL (ref 81.4–97.8)
MICRO URNS: ABNORMAL
MONOCYTES # BLD AUTO: 0.77 K/UL (ref 0–0.85)
MONOCYTES NFR BLD AUTO: 8.9 % (ref 0–13.4)
NEUTROPHILS # BLD AUTO: 4.77 K/UL (ref 1.82–7.42)
NEUTROPHILS NFR BLD: 54.8 % (ref 44–72)
NITRITE UR QL STRIP.AUTO: NEGATIVE
NRBC # BLD AUTO: 0 K/UL
NRBC BLD-RTO: 0 /100 WBC
PH UR STRIP.AUTO: 6 [PH] (ref 5–8)
PLATELET # BLD AUTO: 279 K/UL (ref 164–446)
PMV BLD AUTO: 10.4 FL (ref 9–12.9)
POTASSIUM SERPL-SCNC: 3.5 MMOL/L (ref 3.6–5.5)
PROT SERPL-MCNC: 7.5 G/DL (ref 6–8.2)
PROT UR QL STRIP: NEGATIVE MG/DL
RBC # BLD AUTO: 5.71 M/UL (ref 4.7–6.1)
RBC # URNS HPF: ABNORMAL /HPF
RBC UR QL AUTO: ABNORMAL
SODIUM SERPL-SCNC: 136 MMOL/L (ref 135–145)
SP GR UR STRIP.AUTO: 1.02
TRIGL SERPL-MCNC: 304 MG/DL (ref 0–149)
UROBILINOGEN UR STRIP.AUTO-MCNC: 0.2 MG/DL
WBC # BLD AUTO: 8.7 K/UL (ref 4.8–10.8)
WBC #/AREA URNS HPF: ABNORMAL /HPF

## 2019-11-14 PROCEDURE — 82306 VITAMIN D 25 HYDROXY: CPT

## 2019-11-14 PROCEDURE — 80061 LIPID PANEL: CPT

## 2019-11-14 PROCEDURE — 90471 IMMUNIZATION ADMIN: CPT | Performed by: FAMILY MEDICINE

## 2019-11-14 PROCEDURE — 99214 OFFICE O/P EST MOD 30 MIN: CPT | Mod: 25 | Performed by: FAMILY MEDICINE

## 2019-11-14 PROCEDURE — 80053 COMPREHEN METABOLIC PANEL: CPT

## 2019-11-14 PROCEDURE — 90686 IIV4 VACC NO PRSV 0.5 ML IM: CPT | Performed by: FAMILY MEDICINE

## 2019-11-14 PROCEDURE — 83036 HEMOGLOBIN GLYCOSYLATED A1C: CPT

## 2019-11-14 PROCEDURE — 85025 COMPLETE CBC W/AUTO DIFF WBC: CPT

## 2019-11-14 PROCEDURE — 83735 ASSAY OF MAGNESIUM: CPT

## 2019-11-14 PROCEDURE — 36415 COLL VENOUS BLD VENIPUNCTURE: CPT

## 2019-11-14 PROCEDURE — 81001 URINALYSIS AUTO W/SCOPE: CPT

## 2019-11-14 RX ORDER — ERGOCALCIFEROL 1.25 MG/1
CAPSULE ORAL
Qty: 12 CAP | Refills: 0 | Status: SHIPPED | OUTPATIENT
Start: 2019-11-14 | End: 2020-02-05

## 2019-11-14 RX ORDER — HYDROCHLOROTHIAZIDE 25 MG/1
25 TABLET ORAL DAILY
Qty: 90 TAB | Refills: 0 | Status: SHIPPED | OUTPATIENT
Start: 2019-11-14 | End: 2020-04-07

## 2019-11-14 RX ORDER — AMLODIPINE BESYLATE 5 MG/1
5 TABLET ORAL DAILY
Qty: 90 TAB | Refills: 0 | Status: SHIPPED | OUTPATIENT
Start: 2019-11-14 | End: 2020-04-07

## 2019-11-14 RX ORDER — LISINOPRIL 20 MG/1
20 TABLET ORAL DAILY
Qty: 90 TAB | Refills: 0 | Status: SHIPPED | OUTPATIENT
Start: 2019-11-14 | End: 2020-02-12

## 2019-11-14 NOTE — PROGRESS NOTES
cc: Hypertension stable, BMI 47 with weight gain, fatty liver    Subjective:     Oswald Schuler is a 39 y.o. male presenting he forgot to get lab work done but he will go today.  He has not been having any chest pain or palms breathing but his blood pressure has been doing well.  He was last seen October 8 and his weight then was 319 and today is 328 pounds.  There is some concern for prediabetes in the past.  His blood pressure systolic has been 120s to 130s and his diastolic 70s.  He has been experiencing increased stress due to the health of his dad right now and so he has been having more stress than normal.  October 28 he had kidney ultrasound done which showed a normal kidney ultrasound but increased hepatic echogenicity suggestive of fatty liver.    Review of systems:     Constitutional: Negative for fever, chills and negative fatigue.   HENT: Negative for sinus pressure  Eyes: Negative for blurriness  Respiratory: Negative for cough and shortness of breath, negative for exertional shortness of breath  Cardiovascular: Negative for leg swelling, negative for palpitations, negative for chest pain  Gastrointestinal: Negative for nausea, vomiting, abdominal pain, constipation and diarrhea.  Genitourinary: Negative for dysuria and hematuria.   Neurological: Negative for dizziness, focal weakness and headaches.   Endo/Heme/Allergies: Denies bleeding, bruising, and recurrent allergies.  Psychiatric/Behavioral: Negative for depression and anxiety.        Current Outpatient Medications:   •  hydroCHLOROthiazide (HYDRODIURIL) 25 MG Tab, Take 1 Tab by mouth every day., Disp: 90 Tab, Rfl: 0  •  lisinopril (PRINIVIL) 20 MG Tab, Take 1 Tab by mouth every day., Disp: 90 Tab, Rfl: 0  •  amLODIPine (NORVASC) 5 MG Tab, Take 1 Tab by mouth every day., Disp: 90 Tab, Rfl: 0  •  ergocalciferol (DRISDOL) 19883 UNIT capsule, Take one tab po once a week., Disp: 12 Cap, Rfl: 0  •  Coenzyme Q10 (CO Q-10) 100 MG Cap, Take  by  "mouth., Disp: , Rfl:   •  Multiple Vitamin (MULTI VITAMIN DAILY PO), Take  by mouth., Disp: , Rfl:     Allergies, past medical history, past surgical history, family history, social history reviewed and updated    Objective:     Vitals: /78 (BP Location: Right arm, Patient Position: Sitting, BP Cuff Size: Large adult)   Pulse 94   Temp 37.2 °C (98.9 °F) (Temporal)   Resp (!) 22   Ht 1.778 m (5' 10\")   Wt (!) 148.8 kg (328 lb)   SpO2 94%   BMI 47.06 kg/m²   General: Alert, pleasant, NAD  HEENT: Normocephalic.  Nontraumatic. EOMI, no icterus or pallor.  Conjunctivae and lids normal. External ears normal.   Heart: Regular rate and rhythm.  S1 and S2 normal.  No murmurs appreciated.  Respiratory: Normal respiratory effort.  Clear to auscultation bilaterally.  Skin: Warm, dry, no rashes.  Musculoskeletal: Gait is normal.  Moves all extremities well.  Extremities: No leg edema.   Psych:  Affect/mood is normal, judgement is good, memory is intact, grooming is appropriate.    Assessment/Plan:     Oswald was seen today for results.    Diagnoses and all orders for this visit:    Essential hypertension  -     Comp Metabolic Panel; Future  -     hydroCHLOROthiazide (HYDRODIURIL) 25 MG Tab; Take 1 Tab by mouth every day.  -     lisinopril (PRINIVIL) 20 MG Tab; Take 1 Tab by mouth every day.  -     amLODIPine (NORVASC) 5 MG Tab; Take 1 Tab by mouth every day.    Dyslipidemia  -     Lipid Profile; Future    Hyperglycemia  -     HEMOGLOBIN A1C; Future    Hypomagnesemia  -     MAGNESIUM; Future    Medication monitoring encounter  -     CBC WITH DIFFERENTIAL; Future  -     Comp Metabolic Panel; Future  -     URINALYSIS,CULTURE IF INDICATED; Future    Vitamin D deficiency  -     VITAMIN D,25 HYDROXY; Future  -     ergocalciferol (DRISDOL) 67054 UNIT capsule; Take one tab po once a week.    BMI 45.0-49.9, adult (HCC)    Other fatigue  -     CBC WITH DIFFERENTIAL; Future    Fatty liver    Need for vaccination  -     " Influenza Vaccine Quad Injection (PF)    -Flu vaccine given, please get nonfasting lab work.  Continue to check your blood pressure about twice a week and ideal blood pressure is below 130s over 90s which he is been averaging and not going to low either below 100/60.  We will continue him on above medications for his blood pressure and vitamin D once a week and we will check his labs.  He is going to work on weight loss through diet and exercise and food labeling and portion controls and can try different apps on the phone to as my fitness planner.  We will check his diabetic screen as well 2.  Please read patient instruction section on fatty liver.  ER precautions given if any chest pain, shortness of breath, passing out then please go to the ER call 911 otherwise we will see him back in January to go over his lab work and see how his blood pressure and everything is doing and we went over his kidney ultrasound today about the fatty liver and he will read about this and work on the weight loss.    Return in about 8 weeks (around 1/9/2020), or FU labs/BMI/liver/weight.

## 2019-11-14 NOTE — PATIENT INSTRUCTIONS
Oswald was seen today for results.    Diagnoses and all orders for this visit:    Essential hypertension  -     Comp Metabolic Panel; Future  -     hydroCHLOROthiazide (HYDRODIURIL) 25 MG Tab; Take 1 Tab by mouth every day.  -     lisinopril (PRINIVIL) 20 MG Tab; Take 1 Tab by mouth every day.  -     amLODIPine (NORVASC) 5 MG Tab; Take 1 Tab by mouth every day.    Dyslipidemia  -     Lipid Profile; Future    Hyperglycemia  -     HEMOGLOBIN A1C; Future    Hypomagnesemia  -     MAGNESIUM; Future    Medication monitoring encounter  -     CBC WITH DIFFERENTIAL; Future  -     Comp Metabolic Panel; Future  -     URINALYSIS,CULTURE IF INDICATED; Future    Vitamin D deficiency  -     VITAMIN D,25 HYDROXY; Future  -     ergocalciferol (DRISDOL) 79607 UNIT capsule; Take one tab po once a week.    BMI 45.0-49.9, adult (HCC)    Other fatigue  -     CBC WITH DIFFERENTIAL; Future    Fatty liver    Need for vaccination  -     Influenza Vaccine Quad Injection (PF)    -Flu vaccine given, please get nonfasting lab work.  Continue to check your blood pressure about twice a week and ideal blood pressure is below 130s over 90s which he is been averaging and not going to low either below 100/60.  We will continue him on above medications for his blood pressure and vitamin D once a week and we will check his labs.  He is going to work on weight loss through diet and exercise and food labeling and portion controls and can try different apps on the phone to as my fitness planner.  We will check his diabetic screen as well 2.  Please read patient instruction section on fatty liver.  ER precautions given if any chest pain, shortness of breath, passing out then please go to the ER call 911 otherwise we will see him back in January to go over his lab work and see how his blood pressure and everything is doing and we went over his kidney ultrasound today about the fatty liver and he will read about this and work on the weight loss.    Return in  about 8 weeks (around 1/9/2020), or FU labs/BMI/liver/weight.      Fatty Liver  Introduction  Fatty liver, also called hepatic steatosis or steatohepatitis, is a condition in which too much fat has built up in your liver cells. The liver removes harmful substances from your bloodstream. It produces fluids your body needs. It also helps your body use and store energy from the food you eat.  In many cases, fatty liver does not cause symptoms or problems. It is often diagnosed when tests are being done for other reasons. However, over time, fatty liver can cause inflammation that may lead to more serious liver problems, such as scarring of the liver (cirrhosis).  What are the causes?  Causes of fatty liver may include:  · Drinking too much alcohol.  · Poor nutrition.  · Obesity.  · Cushing syndrome.  · Diabetes.  · Hyperlipidemia.  · Pregnancy.  · Certain drugs.  · Poisons.  · Some viral infections.  What increases the risk?  You may be more likely to develop fatty liver if you:  · Abuse alcohol.  · Are pregnant.  · Are overweight.  · Have diabetes.  · Have hepatitis.  · Have a high triglyceride level.  What are the signs or symptoms?  Fatty liver often does not cause any symptoms. In cases where symptoms develop, they can include:  · Fatigue.  · Weakness.  · Weight loss.  · Confusion.  · Abdominal pain.  · Yellowing of your skin and the white parts of your eyes (jaundice).  · Nausea and vomiting.  How is this diagnosed?  Fatty liver may be diagnosed by:  · Physical exam and medical history.  · Blood tests.  · Imaging tests, such as an ultrasound, CT scan, or MRI.  · Liver biopsy. A small sample of liver tissue is removed using a needle. The sample is then looked at under a microscope.  How is this treated?  Fatty liver is often caused by other health conditions. Treatment for fatty liver may involve medicines and lifestyle changes to manage conditions such as:  · Alcoholism.  · High cholesterol.  · Diabetes.  · Being  overweight or obese.  Follow these instructions at home:  · Eat a healthy diet as directed by your health care provider.  · Exercise regularly. This can help you lose weight and control your cholesterol and diabetes. Talk to your health care provider about an exercise plan and which activities are best for you.  · Do not drink alcohol.  · Take medicines only as directed by your health care provider.  Contact a health care provider if:  You have difficulty controlling your:  · Blood sugar.  · Cholesterol.  · Alcohol consumption.  Get help right away if:  · You have abdominal pain.  · You have jaundice.  · You have nausea and vomiting.  This information is not intended to replace advice given to you by your health care provider. Make sure you discuss any questions you have with your health care provider.

## 2020-02-05 DIAGNOSIS — E55.9 VITAMIN D DEFICIENCY: ICD-10-CM

## 2020-02-05 RX ORDER — ERGOCALCIFEROL 1.25 MG/1
CAPSULE ORAL
Qty: 12 CAP | Refills: 1 | Status: SHIPPED | OUTPATIENT
Start: 2020-02-05 | End: 2020-11-11 | Stop reason: SDUPTHER

## 2020-02-05 NOTE — TELEPHONE ENCOUNTER
Was the patient seen in the last year in this department? Yes    Does patient have an active prescription for medications requested? No     Received Request Via: Pharmacy      Pt met protocol?: Yes   Last ov 11/2019     25-Hydroxy   Vitamin D 25   Date Value Ref Range Status   11/14/2019 18 (L) 30 - 100 ng/mL Final     Comment:     Adult Ranges:   <20 ng/mL - Deficiency  20-29 ng/mL - Insufficiency   ng/mL - Sufficiency  The [employer] Vitamin D Assay is standardized to the  [employer] [facility] reference measurement procedures, a  reference method for the Vitamin D Standardization Program  (VDSP).  The VDSP aligns patient results among 25 (OH)  Vitamin D methods.

## 2020-02-11 DIAGNOSIS — I10 ESSENTIAL HYPERTENSION: ICD-10-CM

## 2020-02-12 RX ORDER — LISINOPRIL 20 MG/1
20 TABLET ORAL DAILY
Qty: 90 TAB | Refills: 1 | Status: SHIPPED | OUTPATIENT
Start: 2020-02-12 | End: 2020-08-20

## 2020-02-12 NOTE — TELEPHONE ENCOUNTER
Refill X 6 months, sent to pharmacy.Pt. Seen in the last 6 months per protocol.   Lab Results   Component Value Date/Time    SODIUM 136 11/14/2019 11:27 AM    POTASSIUM 3.5 (L) 11/14/2019 11:27 AM    CHLORIDE 100 11/14/2019 11:27 AM    CO2 24 11/14/2019 11:27 AM    GLUCOSE 93 11/14/2019 11:27 AM    BUN 16 11/14/2019 11:27 AM    CREATININE 0.72 11/14/2019 11:27 AM    CREATININE 0.69 11/28/2010 12:00 AM    BUNCREATRAT 25 11/28/2010 12:00 AM    GLOMRATE CANCELED 11/28/2010 12:00 AM

## 2020-02-12 NOTE — TELEPHONE ENCOUNTER
Was the patient seen in the last year in this department? Yes    Does patient have an active prescription for medications requested? No     Received Request Via: Pharmacy      Pt met protocol?: Yes    OV 11/19     BP Readings from Last 1 Encounters:   11/14/19 120/78

## 2020-03-16 ENCOUNTER — HOSPITAL ENCOUNTER (OUTPATIENT)
Dept: LAB | Facility: MEDICAL CENTER | Age: 40
End: 2020-03-16
Attending: PHYSICIAN ASSISTANT
Payer: COMMERCIAL

## 2020-03-16 ENCOUNTER — OFFICE VISIT (OUTPATIENT)
Dept: URGENT CARE | Facility: PHYSICIAN GROUP | Age: 40
End: 2020-03-16
Payer: COMMERCIAL

## 2020-03-16 ENCOUNTER — HOSPITAL ENCOUNTER (OUTPATIENT)
Dept: RADIOLOGY | Facility: MEDICAL CENTER | Age: 40
End: 2020-03-16
Attending: PHYSICIAN ASSISTANT
Payer: COMMERCIAL

## 2020-03-16 VITALS
SYSTOLIC BLOOD PRESSURE: 152 MMHG | BODY MASS INDEX: 45.1 KG/M2 | RESPIRATION RATE: 18 BRPM | OXYGEN SATURATION: 97 % | HEIGHT: 70 IN | WEIGHT: 315 LBS | TEMPERATURE: 99.3 F | DIASTOLIC BLOOD PRESSURE: 90 MMHG | HEART RATE: 100 BPM

## 2020-03-16 DIAGNOSIS — M25.462 EFFUSION OF LEFT KNEE: ICD-10-CM

## 2020-03-16 DIAGNOSIS — M25.562 ACUTE PAIN OF LEFT KNEE: ICD-10-CM

## 2020-03-16 LAB — URATE SERPL-MCNC: 8.1 MG/DL (ref 2.5–8.3)

## 2020-03-16 PROCEDURE — 84550 ASSAY OF BLOOD/URIC ACID: CPT

## 2020-03-16 PROCEDURE — 36415 COLL VENOUS BLD VENIPUNCTURE: CPT

## 2020-03-16 PROCEDURE — 99214 OFFICE O/P EST MOD 30 MIN: CPT | Performed by: PHYSICIAN ASSISTANT

## 2020-03-16 PROCEDURE — 73562 X-RAY EXAM OF KNEE 3: CPT | Mod: LT

## 2020-03-16 RX ORDER — MELOXICAM 7.5 MG/1
7.5 TABLET ORAL DAILY
Qty: 10 TAB | Refills: 0 | Status: SHIPPED | OUTPATIENT
Start: 2020-03-16 | End: 2020-03-26

## 2020-03-16 SDOH — HEALTH STABILITY: MENTAL HEALTH: HOW OFTEN DO YOU HAVE A DRINK CONTAINING ALCOHOL?: MONTHLY OR LESS

## 2020-03-16 ASSESSMENT — ENCOUNTER SYMPTOMS
SENSORY CHANGE: 0
JOINT SWELLING: 1
FALLS: 0
TINGLING: 0

## 2020-03-16 ASSESSMENT — FIBROSIS 4 INDEX: FIB4 SCORE: 0.67

## 2020-03-16 NOTE — PROGRESS NOTES
"Subjective:      Oswald Schuler is a 39 y.o. male who presents with Knee Pain (R knee swelling pain x1wk)            Patient is a 39-year-old male who presents to urgent care with left knee pain for the last week.  Patient does report long history of right knee pain of which at the onset of symptoms his right knee was beginning to bother him as well.  Pain then developed bilaterally and now has localized to the left.  He denies specific fall, trauma or injury.  He does report notable painful weightbearing and has been limping daily for the last week.  He is uncertain but believes there may have been increased warmth to the joint as well.  He does feel swollen in the joint as well.  He denies any calf pain or lower leg pain.  He does report somewhat new swelling to the top of the left foot as he is been walking barefoot the last few days.  He does have history of bilateral lower extremity edema and contributes this to his amlodipine.    Knee Pain   This is a new problem. The current episode started in the past 7 days. The problem occurs daily. The problem has been waxing and waning. Associated symptoms include joint swelling. The symptoms are aggravated by walking and twisting (Weight bearing). He has tried ice, NSAIDs and heat for the symptoms. The treatment provided mild relief.   Specifically denies recent long travels, surgeries, prior history of DVT.    Review of Systems   Musculoskeletal: Positive for joint pain and joint swelling. Negative for falls.        Left knee pain   Neurological: Negative for tingling and sensory change.   All other systems reviewed and are negative.         Objective:     /90   Pulse 100   Temp 37.4 °C (99.3 °F) (Temporal)   Resp 18   Ht 1.778 m (5' 10\")   Wt (!) 149.7 kg (330 lb)   SpO2 97%   BMI 47.35 kg/m²    PMH:  has a past medical history of BMI 38.0-38.9,adult (8/9/2012), Cardiac murmur (10/8/2019), Coccyx pain, Dyslipidemia (4/17/2019), and Hypertension. He " also has no past medical history of Anemia, Anxiety, Asthma, Cancer (HCC), Clotting disorder (HCC), Depression, Diabetes (HCC), GERD (gastroesophageal reflux disease), Kidney disease, Stroke (HCC), Thyroid disease, Ulcer, or Urinary tract infection, site not specified.  MEDS: Reviewed .   ALLERGIES: No Known Allergies  SURGHX:   Past Surgical History:   Procedure Laterality Date   • MASS EXCISION GENERAL  2/10/2012    Performed by BRUNILDA LOPEZ at SURGERY [facility]   • CYST EXCISION      right shoulder   • TONSILLECTOMY       SOCHX:  reports that he has quit smoking. His smoking use included cigarettes. He has never used smokeless tobacco. He reports current alcohol use of about 3.0 - 4.0 oz of alcohol per week. He reports that he does not use drugs.  FH: Family history was reviewed, no pertinent findings to report    Physical Exam  Vitals signs reviewed.   Constitutional:       General: He is not in acute distress.     Appearance: He is well-developed.   HENT:      Head: Normocephalic and atraumatic.   Eyes:      Conjunctiva/sclera: Conjunctivae normal.      Pupils: Pupils are equal, round, and reactive to light.   Neck:      Musculoskeletal: Normal range of motion and neck supple.      Trachea: No tracheal deviation.   Cardiovascular:      Rate and Rhythm: Normal rate.   Pulmonary:      Effort: Pulmonary effort is normal. No respiratory distress.      Breath sounds: Normal breath sounds.   Musculoskeletal:         General: Swelling and tenderness present. No deformity.      Left knee: He exhibits swelling and effusion. He exhibits normal range of motion. Tenderness found.      Right lower leg: Edema present.      Left lower leg: Edema present.      Comments: Diffuse tenderness to the anterior portion of the left knee with painful pivoting movements.  Gait is very antalgic.  Without specific bony tenderness.  Negative anterior and posterior drawers.  Without increased warmth noted.  Popliteal fossa, calf,  Achilles, ankle and foot all nontender.  Trace bilateral edema.   Skin:     General: Skin is warm.      Findings: No rash.   Neurological:      Mental Status: He is alert and oriented to person, place, and time.      Coordination: Coordination normal.   Psychiatric:         Behavior: Behavior normal.         Thought Content: Thought content normal.         Judgment: Judgment normal.                 Assessment/Plan:       1. Effusion of left knee  - URIC ACID; Future  - meloxicam (MOBIC) 7.5 MG Tab; Take 1 Tab by mouth every day for 10 days.  Dispense: 10 Tab; Refill: 0    2. Acute pain of left knee  - DX-KNEE 3 VIEWS LEFT; Future  - URIC ACID; Future  - meloxicam (MOBIC) 7.5 MG Tab; Take 1 Tab by mouth every day for 10 days.  Dispense: 10 Tab; Refill: 0    Negative Wells criteria.  xr knee: No evidence of fracture or dislocation.  Large joint effusion is identified. Mild osteoarthritis is present.  Discussed knee brace today however the patient reports this is going to be difficult to utilize in particular at work.  Encouraged the above, ice and avoid other NSAIDs.  I will also make referral for patient to follow-up with sports medicine.    Patient and/or guardian given precautionary s/sx that mandate immediate follow up and evaluation in the ED. Advised of risks of not doing so.  Side effects of the above medications discussed.   DDX, Supportive care, and indications for immediate follow-up discussed with patient.    Instructed to return to clinic or nearest emergency department if we are not available for any change in condition, further concerns, or worsening of symptoms.    The patient and/or guardian demonstrated a good understanding and agreed with the treatment plan.    Please note that this dictation was created using voice recognition software. I have made every reasonable attempt to correct obvious errors, but I expect that there are errors of grammar and possibly content that I did not discover before  finalizing the note.

## 2020-03-20 ENCOUNTER — OFFICE VISIT (OUTPATIENT)
Dept: URGENT CARE | Facility: PHYSICIAN GROUP | Age: 40
End: 2020-03-20
Payer: COMMERCIAL

## 2020-03-20 VITALS
WEIGHT: 315 LBS | RESPIRATION RATE: 20 BRPM | HEART RATE: 100 BPM | OXYGEN SATURATION: 94 % | HEIGHT: 72 IN | BODY MASS INDEX: 42.66 KG/M2 | DIASTOLIC BLOOD PRESSURE: 92 MMHG | TEMPERATURE: 97.8 F | SYSTOLIC BLOOD PRESSURE: 144 MMHG

## 2020-03-20 DIAGNOSIS — M25.462 KNEE EFFUSION, LEFT: ICD-10-CM

## 2020-03-20 DIAGNOSIS — M25.562 ACUTE PAIN OF LEFT KNEE: ICD-10-CM

## 2020-03-20 PROCEDURE — 99213 OFFICE O/P EST LOW 20 MIN: CPT | Performed by: FAMILY MEDICINE

## 2020-03-20 RX ORDER — KETOROLAC TROMETHAMINE 30 MG/ML
60 INJECTION, SOLUTION INTRAMUSCULAR; INTRAVENOUS ONCE
Status: DISCONTINUED | OUTPATIENT
Start: 2020-03-20 | End: 2020-03-20

## 2020-03-20 RX ORDER — KETOROLAC TROMETHAMINE 30 MG/ML
30 INJECTION, SOLUTION INTRAMUSCULAR; INTRAVENOUS ONCE
Status: COMPLETED | OUTPATIENT
Start: 2020-03-20 | End: 2020-03-20

## 2020-03-20 RX ADMIN — KETOROLAC TROMETHAMINE 30 MG: 30 INJECTION, SOLUTION INTRAMUSCULAR; INTRAVENOUS at 09:45

## 2020-03-20 SDOH — HEALTH STABILITY: MENTAL HEALTH: HOW MANY STANDARD DRINKS CONTAINING ALCOHOL DO YOU HAVE ON A TYPICAL DAY?: 1 OR 2

## 2020-03-20 SDOH — HEALTH STABILITY: MENTAL HEALTH: HOW OFTEN DO YOU HAVE 6 OR MORE DRINKS ON ONE OCCASION?: NEVER

## 2020-03-20 ASSESSMENT — ENCOUNTER SYMPTOMS
SENSORY CHANGE: 0
MYALGIAS: 0
FOCAL WEAKNESS: 0
EYE REDNESS: 0
ROS SKIN COMMENTS: NO ABRASION OR LACERATION
FEVER: 0

## 2020-03-20 ASSESSMENT — PAIN SCALES - GENERAL: PAINLEVEL: 7=MODERATE-SEVERE PAIN

## 2020-03-20 ASSESSMENT — FIBROSIS 4 INDEX: FIB4 SCORE: 0.67

## 2020-03-20 NOTE — LETTER
March 20, 2020         Patient: Oswald Schuler   YOB: 1980   Date of Visit: 3/20/2020           To Whom it May Concern:    Oswald Schuler was seen in my clinic on 3/20/2020. Please excuse 3/20 through 3/22/2020.      Sincerely,           Kike Pérez M.D.  Electronically Signed

## 2020-03-20 NOTE — PROGRESS NOTES
Subjective:      Oswald Schuler is a 39 y.o. male who presents with Leg Injury (Pt states he fell and all his weight went on his left leg)            1.5 weeks bilateral knee pain left worse than right. Seen here 4d ago with xray showing effusion. Last night he hyperextended L knee stepping down front porch. Pain is now severe. Difficult weight bearing. Pain severity 7/10. Meloxicam was helping until last night/now now relief. Cane is helpful. No other aggravating or alleviating factors.        Review of Systems   Constitutional: Negative for fever.   Eyes: Negative for redness.   Musculoskeletal: Negative for joint pain and myalgias.   Skin:        No abrasion or laceration     Neurological: Negative for sensory change and focal weakness.     .  Medications, Allergies, and current problem list reviewed today in Epic       Objective:     /92 (BP Location: Left arm, Patient Position: Sitting, BP Cuff Size: Adult long)   Pulse 100   Temp 36.6 °C (97.8 °F) (Temporal)   Resp 20   Ht 1.829 m (6')   Wt (!) 149.7 kg (330 lb)   SpO2 94%   BMI 44.76 kg/m²      Physical Exam  Constitutional:       General: He is not in acute distress.     Appearance: Normal appearance. He is well-developed.   HENT:      Head: Normocephalic and atraumatic.   Musculoskeletal:      Comments: Left knee: diffusely tender, STS. ROM intact. Pain with full extension and flexion at 90 degrees. No obvious intsability with limited exam. Distal neuro/vascular intact.      Skin:     General: Skin is warm and dry.      Findings: No rash.   Neurological:      General: No focal deficit present.      Mental Status: He is alert and oriented to person, place, and time.                 Assessment/Plan:     Xray 3/16 reviewed    1. Acute pain of left knee  ketorolac (TORADOL) injection 30 mg    DISCONTINUED: ketorolac (TORADOL) injection 60 mg   2. Knee effusion, left           Differential diagnosis, natural history, supportive care, and  indications for immediate follow-up discussed at length.     Relative rest, ice, nsaid prn. Elevation and compression prn swelling.   F/u sports med

## 2020-03-24 ENCOUNTER — OFFICE VISIT (OUTPATIENT)
Dept: MEDICAL GROUP | Facility: CLINIC | Age: 40
End: 2020-03-24
Payer: COMMERCIAL

## 2020-03-24 ENCOUNTER — APPOINTMENT (OUTPATIENT)
Dept: RADIOLOGY | Facility: IMAGING CENTER | Age: 40
End: 2020-03-24
Attending: FAMILY MEDICINE
Payer: COMMERCIAL

## 2020-03-24 VITALS
TEMPERATURE: 98.1 F | DIASTOLIC BLOOD PRESSURE: 92 MMHG | SYSTOLIC BLOOD PRESSURE: 130 MMHG | RESPIRATION RATE: 20 BRPM | OXYGEN SATURATION: 93 % | BODY MASS INDEX: 42.66 KG/M2 | WEIGHT: 315 LBS | HEART RATE: 102 BPM | HEIGHT: 72 IN

## 2020-03-24 DIAGNOSIS — E66.01 MORBID OBESITY WITH BMI OF 40.0-44.9, ADULT (HCC): ICD-10-CM

## 2020-03-24 DIAGNOSIS — M17.5 OTHER SECONDARY OSTEOARTHRITIS OF LEFT KNEE: ICD-10-CM

## 2020-03-24 DIAGNOSIS — M22.2X2 PATELLOFEMORAL PAIN SYNDROME OF LEFT KNEE: ICD-10-CM

## 2020-03-24 PROCEDURE — 20610 DRAIN/INJ JOINT/BURSA W/O US: CPT | Performed by: FAMILY MEDICINE

## 2020-03-24 PROCEDURE — 73560 X-RAY EXAM OF KNEE 1 OR 2: CPT | Mod: TC,LT | Performed by: FAMILY MEDICINE

## 2020-03-24 PROCEDURE — 99203 OFFICE O/P NEW LOW 30 MIN: CPT | Mod: 25 | Performed by: FAMILY MEDICINE

## 2020-03-24 PROCEDURE — 73565 X-RAY EXAM OF KNEES: CPT | Mod: TC | Performed by: FAMILY MEDICINE

## 2020-03-24 RX ORDER — TRIAMCINOLONE ACETONIDE 40 MG/ML
40 INJECTION, SUSPENSION INTRA-ARTICULAR; INTRAMUSCULAR ONCE
Status: COMPLETED | OUTPATIENT
Start: 2020-03-24 | End: 2020-03-24

## 2020-03-24 RX ADMIN — TRIAMCINOLONE ACETONIDE 40 MG: 40 INJECTION, SUSPENSION INTRA-ARTICULAR; INTRAMUSCULAR at 09:09

## 2020-03-24 ASSESSMENT — FIBROSIS 4 INDEX: FIB4 SCORE: 0.67

## 2020-03-24 NOTE — PROGRESS NOTES
CHIEF COMPLAINT:  Oswald Schuler male presenting at the request of Kike Pérez M.D. for evaluation of knee pain.     Oswald Schuler is complaining of left knee pain  present for 3 weeks  Pain is at the entire knee  Quality is aching, sharp  Pain is non-radiating   Improved with nothing  Aggravated by standing, walking, can be worse and even cause hip pain with elevating the leg too much  no prior problems with this area in the past   Prior Treatments: seen at   Prior studies: X-Ray   Medications tried for pain include: meloxicam, which helps  Mechanical Symptom history: No Locking and With what sounds like a subluxation episode when he got into his car and straighten his leg out causing excruciating pain and a pop at the anterior LEFT knee     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 No pertinent past medical history.    PMH:  has a past medical history of BMI 38.0-38.9,adult (8/9/2012), Cardiac murmur (10/8/2019), Coccyx pain, Dyslipidemia (4/17/2019), and Hypertension. He also has no past medical history of Anemia, Anxiety, Asthma, Cancer (HCC), Clotting disorder (HCC), Depression, Diabetes (HCC), GERD (gastroesophageal reflux disease), Kidney disease, Stroke (HCC), Thyroid disease, Ulcer, or Urinary tract infection, site not specified.  MEDS:   Current Outpatient Medications:   •  meloxicam (MOBIC) 7.5 MG Tab, Take 1 Tab by mouth every day for 10 days., Disp: 10 Tab, Rfl: 0  •  lisinopril (PRINIVIL) 20 MG Tab, Take 1 Tab by mouth every day., Disp: 90 Tab, Rfl: 1  •  vitamin D, Ergocalciferol, (DRISDOL) 1.25 MG (08621 UT) Cap capsule, take one capsule by mouth once a week, Disp: 12 Cap, Rfl: 1  •  hydroCHLOROthiazide (HYDRODIURIL) 25 MG Tab, Take 1 Tab by mouth every day., Disp: 90 Tab, Rfl: 0  •  amLODIPine (NORVASC) 5 MG Tab,  Take 1 Tab by mouth every day., Disp: 90 Tab, Rfl: 0  •  Coenzyme Q10 (CO Q-10) 100 MG Cap, Take  by mouth., Disp: , Rfl:   •  Multiple Vitamin (MULTI VITAMIN DAILY PO), Take  by mouth., Disp: , Rfl:   ALLERGIES: No Known Allergies  SURGHX:   Past Surgical History:   Procedure Laterality Date   • MASS EXCISION GENERAL  2/10/2012    Performed by BRUNILDA LOPEZ at SURGERY [facility]   • CYST EXCISION      right shoulder   • TONSILLECTOMY       SOCHX:  reports that he has quit smoking. His smoking use included cigarettes. He has never used smokeless tobacco. He reports current alcohol use of about 3.0 - 4.0 oz of alcohol per week. He reports that he does not use drugs.  FH: Family history was reviewed, no pertinent findings to report     PHYSICAL EXAM:  /92 (BP Location: Left arm, Patient Position: Sitting, BP Cuff Size: Large adult)   Pulse (!) 102   Temp 36.7 °C (98.1 °F) (Temporal)   Resp 20   Ht 1.829 m (6')   Wt (!) 149.7 kg (330 lb)   SpO2 93%   BMI 44.76 kg/m²      obese in no apparent distress, alert and oriented x 3.  Gait: antalgic     RIGHT Knee:  Slight Varus and No Swelling  Range of Motion Intact with POSITIVE peripatellar crepitus  Trace effusion  Patellar No tenderness and no apprehension  Medial Joint Line Non-tender and NEGATIVE Rebecca  Lateral Joint Line Non-tender and NEGATIVE Rebecca  Trace Laxity with Varus stress  Trace Laxity with Valgus stress  Lachman's testing is Trace  Posterior Drawer Testing is Trace  The leg is otherwise neurovascularly intact    LEFT Knee:  Slight Varus and No Swelling   Range of Motion Pain at extremes of motion  1+ effusion  Patellar Medial facet tenderness and Apprehension  Medial Joint Line Non-tender and NEGATIVE Rebecca  Lateral Joint Line Non-tender and NEGATIVE Rebecca  Trace Laxity with Varus stress  Trace Laxity with Valgus stress  Lachman's testing is Trace  Posterior Drawer Testing is Trace  The leg is otherwise neurovascularly  intact    Additional Findings: Tight hamstrings    1. Patellofemoral pain syndrome of left knee  DX-KNEES-AP BILATERAL STANDING    DX-KNEE 2- LEFT    triamcinolone acetonide (KENALOG-40) injection 40 mg   2. Other secondary osteoarthritis of left knee     3. Morbid obesity with BMI of 40.0-44.9, adult (HCC)  REFERRAL TO [facility] IMPROVEMENT [facility] (HIP) Services Requested: General-HIP Staff to Evaluate Best Program; Reason for Visit: Overweight/Obesity, Medical Condition Requiring Nutrition Counseling     Discussed knee osteoarthritis management options includin.  Joint protection, SAMe and anti-inflammatories   2.  Non-offending activities such as cycling or swimming   3.  Knee bracing, Acupuncture  4. Injection options including corticosteroid, viscosupplementation and stem cell injections  5. Surgical options    Patient has elected to proceed with LEFT knee intra-articular corticosteroid injection which was performed in the office TODAY (2020)    Return in about 4 weeks (around 2020).   To see how is doing with his knee brace and LEFT knee intra-articular corticosteroid injection        3/24/2020 8:42 AM     HISTORY/REASON FOR EXAM:  Atraumatic Pain/Swelling/Deformity.        TECHNIQUE/EXAM DESCRIPTION AND NUMBER OF VIEWS:   1 views of the standing bilateral knees.     COMPARISON: None     FINDINGS:  There is mild medial tibiofemoral joint space narrowing bilaterally. There is spurring of the medial femoral condyle and tibial plateau on the right and mild spurring of the lateral femoral condyle and tibial plateau on the left. No fracture or   dislocation is seen.     IMPRESSION:     No fracture or dislocation is seen.     Mild degenerative changes.        3/24/2020 8:40 AM     HISTORY/REASON FOR EXAM:  Atraumatic Pain/Swelling/Deformity; Bilateral sunrise view        TECHNIQUE/EXAM DESCRIPTION AND NUMBER OF VIEWS:  1 views of the LEFT knee.     COMPARISON: None     FINDINGS:  There is  mild patellofemoral joint space narrowing and spurring of the patella bilaterally. No dislocation is seen.     IMPRESSION:     No dislocation.     Mild degenerative changes.          3/16/2020 9:50 AM     HISTORY/REASON FOR EXAM:  Left knee pain        TECHNIQUE/EXAM DESCRIPTION AND NUMBER OF VIEWS:  3 views of the LEFT knee.     COMPARISON: None     FINDINGS:  Bone density is normal.  There is no evidence of fracture or dislocation.  There is mild joint space narrowing and periarticular sclerosis and marginal spurring.  There is large joint effusion.     IMPRESSION:     No evidence of fracture or dislocation.  Large joint effusion is identified. Mild osteoarthritis is present.    done elsewhere and reviewed independently by me    Thank you Kike Pérez M.D. for allowing me to participate in caring for your patient.

## 2020-03-24 NOTE — PROCEDURES
PROCEDURE NOTE:  left Knee corticosteroid injection  Risks and benefits discussed  Informed consent obtained  Knee prepped in sterile fashion utilizing a cam- inferior approach  40 mg of Kenalog and 5 cc of bupivacaine injected into the knee joint space  Vapocoolant spray was utilized  Patient tolerated the procedure well  Postprocedure care and red flags discussed]

## 2020-03-24 NOTE — LETTER
March 24, 2020         Patient: Oswald Schuler   YOB: 1980   Date of Visit: 3/24/2020           To Whom it May Concern:    Oswald Schuler was seen in my clinic on 3/24/2020. He may return to work, but recommend avoiding standing greater than 30 minutes/h until on or after March 30, 2020.    If you have any questions or concerns, please don't hesitate to call.        Sincerely,           Roberth Hollis M.D.  Electronically Signed

## 2020-04-06 DIAGNOSIS — I10 ESSENTIAL HYPERTENSION: ICD-10-CM

## 2020-04-07 ENCOUNTER — EH NON-PROVIDER (OUTPATIENT)
Dept: OCCUPATIONAL MEDICINE | Facility: CLINIC | Age: 40
End: 2020-04-07

## 2020-04-07 RX ORDER — HYDROCHLOROTHIAZIDE 25 MG/1
TABLET ORAL
Qty: 90 TAB | Refills: 1 | Status: SHIPPED | OUTPATIENT
Start: 2020-04-07 | End: 2020-08-24 | Stop reason: SDUPTHER

## 2020-04-07 RX ORDER — AMLODIPINE BESYLATE 5 MG/1
TABLET ORAL
Qty: 90 TAB | Refills: 1 | Status: SHIPPED | OUTPATIENT
Start: 2020-04-07 | End: 2020-08-24 | Stop reason: SDUPTHER

## 2020-04-15 ENCOUNTER — OFFICE VISIT (OUTPATIENT)
Dept: URGENT CARE | Facility: PHYSICIAN GROUP | Age: 40
End: 2020-04-15
Payer: COMMERCIAL

## 2020-04-15 VITALS
WEIGHT: 315 LBS | RESPIRATION RATE: 20 BRPM | BODY MASS INDEX: 42.66 KG/M2 | DIASTOLIC BLOOD PRESSURE: 98 MMHG | HEIGHT: 72 IN | HEART RATE: 108 BPM | TEMPERATURE: 98.7 F | OXYGEN SATURATION: 97 % | SYSTOLIC BLOOD PRESSURE: 156 MMHG

## 2020-04-15 DIAGNOSIS — M25.562 PATELLOFEMORAL ARTHRALGIA OF LEFT KNEE: ICD-10-CM

## 2020-04-15 DIAGNOSIS — M25.562 ACUTE PAIN OF LEFT KNEE: ICD-10-CM

## 2020-04-15 PROCEDURE — 99214 OFFICE O/P EST MOD 30 MIN: CPT | Performed by: NURSE PRACTITIONER

## 2020-04-15 RX ORDER — DICLOFENAC SODIUM 75 MG/1
75 TABLET, DELAYED RELEASE ORAL 2 TIMES DAILY
Qty: 30 TAB | Refills: 0 | Status: SHIPPED | OUTPATIENT
Start: 2020-04-15

## 2020-04-15 ASSESSMENT — ENCOUNTER SYMPTOMS
FOCAL WEAKNESS: 0
CHILLS: 0
NAUSEA: 0
FALLS: 0
FEVER: 0
TINGLING: 0

## 2020-04-15 ASSESSMENT — LIFESTYLE VARIABLES: SUBSTANCE_ABUSE: 0

## 2020-04-15 ASSESSMENT — FIBROSIS 4 INDEX: FIB4 SCORE: 0.67

## 2020-04-15 NOTE — PROGRESS NOTES
"Subjective:      Oswald Schuler is a 39 y.o. male who presents with Knee Pain (xwks L knee )    Reviewed past medical, surgical and family history. Reviewed prescription and OTC medications with patient in electronic health record today        No Known Allergies          HPI this is a new problem.  Oswald is a 39-year-old male patient presents with left knee pain for \"weeks.  He has been seeing sports medicine for this problem but does not have any further appointments scheduled.  Reports his pain as 8 out of 10 worse at the lateral vastus quadriceps area.  \"it just wont release\". He has been trying pressure balls, exercise bands and frequent flexion / extension movements of his knee but those only seem to aggravate his pain. He is intermittently wearing his knee brace.  Pain is nearly constant, dull to intermittently sharp, non-radiating. No other aggravating or alleviating factors.       Review of Systems   Constitutional: Negative for chills, fever and malaise/fatigue.   Gastrointestinal: Negative for nausea.   Musculoskeletal: Positive for joint pain. Negative for falls.   Neurological: Negative for tingling and focal weakness.   Psychiatric/Behavioral: Negative for substance abuse.           Objective:     /98   Pulse (!) 108   Temp 37.1 °C (98.7 °F) (Temporal)   Resp 20   Ht 1.829 m (6')   Wt (!) 149.7 kg (330 lb) Comment: pt states, our scale did not read  SpO2 97%   BMI 44.76 kg/m²      Physical Exam  Vitals signs and nursing note reviewed.   Constitutional:       Appearance: He is well-developed.   Cardiovascular:      Rate and Rhythm: Normal rate and regular rhythm.   Pulmonary:      Effort: Pulmonary effort is normal. No respiratory distress.   Musculoskeletal:      Left knee: He exhibits normal range of motion, no swelling, no effusion, no ecchymosis, no deformity, no laceration, no erythema, normal alignment, no LCL laxity, normal patellar mobility, no bony tenderness, normal " meniscus and no MCL laxity. Tenderness found. No medial joint line, no lateral joint line, no MCL and no LCL tenderness noted.        Legs:       Comments: Neg SLR   FROM  Neg anterior drawer  Neg Varus/ Valgus test     Skin:     General: Skin is warm and dry.   Neurological:      Mental Status: He is alert.      Cranial Nerves: No cranial nerve deficit.      Coordination: Coordination normal.      Deep Tendon Reflexes: Reflexes are normal and symmetric.   Psychiatric:         Mood and Affect: Mood normal.         Speech: Speech normal.         Thought Content: Thought content normal.                 Assessment/Plan:       1. Patellofemoral arthralgia of left knee  diclofenac EC (VOLTAREN) 75 MG Tablet Delayed Response    Diclofenac Sodium 1 % Gel    REFERRAL TO SPORTS MEDICINE   2. Acute pain of left knee  diclofenac EC (VOLTAREN) 75 MG Tablet Delayed Response    Diclofenac Sodium 1 % Gel    REFERRAL TO SPORTS MEDICINE         Educated in proper administration of medication(s) ordered today including safety, possible SE, risks, benefits, rationale and alternatives to therapy.     Knee brace prn     Ice packs prn     Gentle ROM exercises - done slowly     Fu sport's medicine - consider physical therapy evaluation     Educated in weight loss to help reduce knee pain.     Return to urgent care clinic or PCP if current symptoms are not resolving in a satisfactory manner or sooner if new or worsening symptoms occur.     Differential diagnosis, natural history, supportive care, and indications for immediate follow-up. Advised of signs and symptoms which would warrant further evaluation     Verbalized agreement with this treatment plan and seemed to understand without barriers. Questions were encouraged and answered to satisfaction.

## 2020-04-16 ENCOUNTER — OFFICE VISIT (OUTPATIENT)
Dept: MEDICAL GROUP | Facility: PHYSICIAN GROUP | Age: 40
End: 2020-04-16
Payer: COMMERCIAL

## 2020-04-16 VITALS
SYSTOLIC BLOOD PRESSURE: 124 MMHG | RESPIRATION RATE: 16 BRPM | HEIGHT: 72 IN | OXYGEN SATURATION: 92 % | HEART RATE: 105 BPM | WEIGHT: 315 LBS | DIASTOLIC BLOOD PRESSURE: 76 MMHG | BODY MASS INDEX: 42.66 KG/M2 | TEMPERATURE: 98.9 F

## 2020-04-16 DIAGNOSIS — E55.9 VITAMIN D DEFICIENCY: ICD-10-CM

## 2020-04-16 DIAGNOSIS — Z02.89 ENCOUNTER FOR COMPLETION OF FORM WITH PATIENT: ICD-10-CM

## 2020-04-16 DIAGNOSIS — M25.562 PATELLOFEMORAL ARTHRALGIA OF LEFT KNEE: ICD-10-CM

## 2020-04-16 DIAGNOSIS — I10 ESSENTIAL HYPERTENSION: ICD-10-CM

## 2020-04-16 DIAGNOSIS — E78.5 DYSLIPIDEMIA: ICD-10-CM

## 2020-04-16 DIAGNOSIS — Z76.89 ENCOUNTER TO ESTABLISH CARE: ICD-10-CM

## 2020-04-16 PROBLEM — Z51.81 ENCOUNTER FOR THERAPEUTIC DRUG MONITORING: Status: RESOLVED | Noted: 2019-04-17 | Resolved: 2020-04-16

## 2020-04-16 PROBLEM — R01.1 CARDIAC MURMUR: Status: RESOLVED | Noted: 2019-10-08 | Resolved: 2020-04-16

## 2020-04-16 PROBLEM — M79.89 LEG SWELLING: Status: RESOLVED | Noted: 2019-04-17 | Resolved: 2020-04-16

## 2020-04-16 PROCEDURE — 99214 OFFICE O/P EST MOD 30 MIN: CPT | Performed by: NURSE PRACTITIONER

## 2020-04-16 ASSESSMENT — PATIENT HEALTH QUESTIONNAIRE - PHQ9: CLINICAL INTERPRETATION OF PHQ2 SCORE: 0

## 2020-04-16 ASSESSMENT — FIBROSIS 4 INDEX: FIB4 SCORE: 0.67

## 2020-04-16 NOTE — ASSESSMENT & PLAN NOTE
Chronic medical problem. New to examiner. Patient is currently using a brace to left knee.  He reports the brace does help.  He was seen in urgent care on 4/15/2020 was prescribed diclofenac oral and gel.  He has not picked up his prescriptions yet.  He has seen sports medicine in March and did receive a Kenalog shot, which he said did help.  He has been stretching, icing, elevating, and massaging the area.  He has not been to physical therapy yet, he would like referral.  Patient also has [facility] paperwork that he would like completed for his knee.

## 2020-04-16 NOTE — ASSESSMENT & PLAN NOTE
Chronic medical problem. New to examiner. Patient is currently taking vitamin 50, 000 units weekly.  Last lab results:  Results for CHRIS SINGLETON (MRN [id number]) as of 4/16/2020 07:50   Ref. Range 11/14/2019 11:27   25-Hydroxy   Vitamin D 25 Latest Ref Range: 30 - 100 ng/mL 18 (L)

## 2020-04-16 NOTE — PROGRESS NOTES
Subjective:     CC:  Diagnoses of Essential hypertension, Dyslipidemia, Patellofemoral arthralgia of left knee, Vitamin D deficiency, BMI 45.0-49.9, adult (Formerly [facility]), Encounter for completion of form with patient, and Encounter to establish care were pertinent to this visit.    HISTORY OF THE PRESENT ILLNESS: Patient is a 39 y.o. male. This pleasant patient is here today to establish care and discuss the following. His prior PCP was Dr. Quintanilla.    Vitamin D deficiency  Chronic medical problem. New to examiner. Patient is currently taking vitamin 50, 000 units weekly.  Last lab results:  Results for CHRIS SINGLETON (MRN [id number]) as of 4/16/2020 07:50   Ref. Range 11/14/2019 11:27   25-Hydroxy   Vitamin D 25 Latest Ref Range: 30 - 100 ng/mL 18 (L)       Essential hypertension  Chronic medical problem. New to examiner. Patient is currently taking lisinopril 20 mg daily, HCTZ 25 mg daily and amlodipine 5 mg daily.  He does not check his blood pressure regularly.  When he does check it at home or at work his blood pressure is 130s over 80s-90s.  BP today 124/76.  Denies chest pain, shortness of breath, dizziness, palpitations, or headaches.    Patellofemoral arthralgia of left knee  Chronic medical problem. New to examiner. Patient is currently using a brace to left knee.  He reports the brace does help.  He was seen in urgent care on 4/15/2020 was prescribed diclofenac oral and gel.  He has not picked up his prescriptions yet.  He has seen sports medicine in March and did receive a Kenalog shot, which he said did help.  He has been stretching, icing, elevating, and massaging the area.  He has not been to physical therapy yet, he would like referral.  Patient also has [facility] paperwork that he would like completed for his knee.      Dyslipidemia  Chronic medical problem. New to examiner. Patient is currently not taking any medication.  He would prefer to not be on any cholesterol medication and he would like to try diet  and exercise once his left knee pain has resolved.  He was referred to health improvement programs and is waiting to schedule appointment.  Last lab results:  Results for CHRIS SINGLETON (MRN [id number]) as of 4/16/2020 07:50   Ref. Range 11/14/2019 11:27   Cholesterol,Tot Latest Ref Range: 100 - 199 mg/dL 230 (H)   Triglycerides Latest Ref Range: 0 - 149 mg/dL 304 (H)   HDL Latest Ref Range: >=40 mg/dL 49   LDL Latest Ref Range: <100 mg/dL 120 (H)         BMI 45.0-49.9, adult (HCC)  Chronic medical problem. New to examiner. Patient BMI today 46.79.  He has referral to health improvement programs.        Allergies: Patient has no known allergies.    Current Outpatient Medications Ordered in Epic   Medication Sig Dispense Refill   • hydroCHLOROthiazide (HYDRODIURIL) 25 MG Tab TAKE ONE TABLET BY MOUTH ONE TIME DAILY  90 Tab 1   • amLODIPine (NORVASC) 5 MG Tab TAKE ONE TABLET BY MOUTH ONE TIME DAILY  90 Tab 1   • lisinopril (PRINIVIL) 20 MG Tab Take 1 Tab by mouth every day. 90 Tab 1   • vitamin D, Ergocalciferol, (DRISDOL) 1.25 MG (09575 UT) Cap capsule take one capsule by mouth once a week 12 Cap 1   • Coenzyme Q10 (CO Q-10) 100 MG Cap Take  by mouth.     • Multiple Vitamin (MULTI VITAMIN DAILY PO) Take  by mouth.     • diclofenac EC (VOLTAREN) 75 MG Tablet Delayed Response Take 1 Tab by mouth 2 times a day. (Patient not taking: Reported on 4/16/2020) 30 Tab 0   • Diclofenac Sodium 1 % Gel Apply BID to knee for pain (Patient not taking: Reported on 4/16/2020) 1 Tube 0     No current Epic-ordered facility-administered medications on file.        Past Medical History:   Diagnosis Date   • BMI 38.0-38.9,adult 8/9/2012   • Coccyx pain 2010    s/p fall   • Dyslipidemia 4/17/2019   • Hypertension        Past Surgical History:   Procedure Laterality Date   • MASS EXCISION GENERAL  2/10/2012    Performed by BRUNILDA LOEPZ at [facility]   • LIPOMA EXCISION Left 2012    left buttock   • CYST EXCISION       right shoulder   • TONSILLECTOMY         Social History     Tobacco Use   • Smoking status: Current Some Day Smoker     Types: Cigarettes   • Smokeless tobacco: Never Used   • Tobacco comment: smoke occasionally when having drink   Substance Use Topics   • Alcohol use: Yes     Alcohol/week: 3.0 - 4.0 oz     Types: 6 - 8 Standard drinks or equivalent per week     Frequency: Monthly or less     Drinks per session: 1 or 2     Binge frequency: Never   • Drug use: No       Social History     Social History Narrative   • Not on file       Family History   Problem Relation Age of Onset   • No Known Problems Mother    • Hypertension Father    • Cancer Maternal Grandfather        Health Maintenance: due for pneumonia vaccine    ROS:   Gen: no fevers/chills, no changes in weight  Eyes: no changes in vision  ENT: no sore throat, no ear pain  Pulm: no sob, no cough  CV: no chest pain, no palpitations  GI: no nausea/vomiting, no diarrhea  : no dysuria  MSk: no myalgias,+ left lateral knee pain  Skin: no rash,+ small fingerprint-sized bruise to left lateral knee  Neuro: no headaches, no numbness/tingling  Heme/Lymph: no easy bruising      Objective:     Vital signs reviewed  Exam: /76 (BP Location: Right arm, Patient Position: Sitting, BP Cuff Size: Large adult)   Pulse (!) 105   Temp 37.2 °C (98.9 °F) (Temporal)   Resp 16   Ht 1.829 m (6')   Wt (!) 156.5 kg (345 lb)   SpO2 92%  Body mass index is 46.79 kg/m².    General: Normal appearing. No distress.  HENT: Normocephalic. Ears normal shape and contour, canals are clear bilaterally, tympanic membranes are benign.   Eyes: Eyes conjunctiva clear lids without ptosis, pupils equal and reactive to light accommodation, lids normal. No scleral icterus.  Neck: Supple without JVD. Thyroid is not enlarged.  Pulmonary: Clear to ausculation.  Normal effort. No rales, ronchi, or wheezing.  Cardiovascular: Regular rate and rhythm without murmur. Radial pulses are intact and  equal bilaterally.  Abdomen: Soft, nontender, obese, nondistended. Normal bowel sounds.   Neurologic: Grossly nonfocal  Lymph: No cervical or supraclavicular lymph nodes are palpable  Skin: Warm and dry.  No obvious lesions.  Musculoskeletal: Normal gait. No extremity cyanosis, clubbing, or edema. Left knee in external brace, swelling and tenderness to left lateral knee with small fingerprint sized bruise. Patient states this has improved.   Psych: Normal mood and affect. Alert and oriented x3. Judgment and insight is normal.      Assessment & Plan:   39 y.o. male with the following -    1. Essential hypertension  New problem to examiner.  Continue lisinopril, hydrochlorothiazide, and amlodipine.  BP at goal today.  Monitor and follow.    2. Dyslipidemia  New problem to examiner.  Continue diet and lifestyle modifications.  Discussed completing follow-up with health improvement programs.  Patient does not want to start medication at this time.  We will repeat lipid panel at the end of summer as patient would like to wait until his left knee pain resolves to begin exercising again.  Monitor and follow.  - LIPID PANEL    3. Patellofemoral arthralgia of left knee  New problem to examiner.  Referral placed to physical therapy.  Discussed following up with health improvement program and weight loss will help as well.  Continue follow-up with sports medicine.  Monitor and follow.  - REFERRAL TO PHYSICAL THERAPY Reason for Therapy: Eval/Treat/Report    4. Vitamin D deficiency  New problem to examiner.  Continue vitamin D.  Patient will be due for repeat lab work prior to next appointment.  Monitor and follow.  - VITAMIN D,25 HYDROXY; Future    5. BMI 45.0-49.9, adult (HCC)  New problem to examiner.  Health improvement program referral in place.  Discussed diet and lifestyle modifications.  Monitor and follow.    6. Encounter for completion of form with patient  New problem to examiner.  [facility] paperwork completed with  patient today.    7. Encounter to establish care  New problem to examiner.  Care established.  Patient will complete lab work prior to next appointment around August 2020.  Monitor and follow.      Return in about 4 months (around 8/16/2020) for cholesterol.    Please note that this dictation was created using voice recognition software. I have made every reasonable attempt to correct obvious errors, but I expect that there are errors of grammar and possibly content that I did not discover before finalizing the note.

## 2020-04-16 NOTE — ASSESSMENT & PLAN NOTE
Chronic medical problem. New to examiner. Patient is currently taking lisinopril 20 mg daily, HCTZ 25 mg daily and amlodipine 5 mg daily.  He does not check his blood pressure regularly.  When he does check it at home or at work his blood pressure is 130s over 80s-90s.  BP today 124/76.  Denies chest pain, shortness of breath, dizziness, palpitations, or headaches.

## 2020-04-16 NOTE — ASSESSMENT & PLAN NOTE
Chronic medical problem. New to examiner. Patient BMI today 46.79.  He has referral to health improvement programs.

## 2020-04-16 NOTE — ASSESSMENT & PLAN NOTE
Chronic medical problem. New to examiner. Patient is currently not taking any medication.  He would prefer to not be on any cholesterol medication and he would like to try diet and exercise once his left knee pain has resolved.  He was referred to health improvement programs and is waiting to schedule appointment.  Last lab results:  Results for CHRIS SINGLETON (MRN [id number]) as of 4/16/2020 07:50   Ref. Range 11/14/2019 11:27   Cholesterol,Tot Latest Ref Range: 100 - 199 mg/dL 230 (H)   Triglycerides Latest Ref Range: 0 - 149 mg/dL 304 (H)   HDL Latest Ref Range: >=40 mg/dL 49   LDL Latest Ref Range: <100 mg/dL 120 (H)

## 2020-04-22 ENCOUNTER — PHYSICAL THERAPY (OUTPATIENT)
Dept: PHYSICAL THERAPY | Facility: REHABILITATION | Age: 40
End: 2020-04-22
Attending: NURSE PRACTITIONER
Payer: COMMERCIAL

## 2020-04-22 DIAGNOSIS — M25.562 PATELLOFEMORAL ARTHRALGIA OF LEFT KNEE: ICD-10-CM

## 2020-04-22 PROCEDURE — 97110 THERAPEUTIC EXERCISES: CPT

## 2020-04-22 PROCEDURE — 97161 PT EVAL LOW COMPLEX 20 MIN: CPT

## 2020-04-22 ASSESSMENT — ENCOUNTER SYMPTOMS
ALLEVIATING FACTORS: BRACE
ALLEVIATING FACTORS: REST
EXACERBATED BY: STAIR CLIMBING
PAIN LOCATION: LATERAL LEFT KNEE
EXACERBATED BY: SQUATTING
PAIN TIMING: CONSTANT
PAIN SCALE: 1
EXACERBATED BY: WALKING
EXACERBATED BY: KNEELING

## 2020-04-22 NOTE — OP THERAPY EVALUATION
"  Outpatient Physical Therapy  INITIAL EVALUATION    [facility] Physical Therapy [address].  [address]  [place] [address]  Phone:  [phone]  Fax:  [phone]    Date of Evaluation: 04/22/2020    Patient: Oswald Schuler  YOB: 1980  MRN: [id number]     Referring Provider: BROWN Chen[address] [address]   Referring Diagnosis Patellofemoral arthralgia of left knee [M25.562]     Time Calculation  Start time: 0815  Stop time: 0915 Time Calculation (min): 60 minutes       Physical Therapy Occurrence Codes    Date of onset of impairment:  3/22/20   Date physical therapy care plan established or reviewed:  4/22/20   Date physical therapy treatment started:  4/22/20          Chief Complaint: Knee Problem    Visit Diagnoses     ICD-10-CM   1. Patellofemoral arthralgia of left knee M25.562         Subjective:   History of Present Illness:     Date of onset:  3/22/2020    Mechanism of injury:  Pt reports hx of chronic and recurrent right knee pain.  Reports about a month ago, right knee felt stiff and swollen, then both knees felt stiff and swollen, with no precipitating event.  Right knee resolved.  Left knee worsened.  Ibuprofen and tylenol did not help.  Meloxicam provided some relief.  Walking out door, tripped and landed on LLE with knee in terminal extension.  \"Felt like an explosion\".  Could not weight bear.  Out of work two weeks.  Not improved with rest. Tried to sleep with leg elevated.  Pt saw sports med doctor- Xrays taken.  Left patella seen in imaging laterally positioned.  Kenalog shot on 3/24 brought pain from 7 to 3/10.  Now more like a constant 0.5/10.    Squatting, kneeling, stairs, and putting socks on are still painful.  Still has occasional positional pain.  Diclofenac topical gel to lateral knee.  Lateral muscle feels bound up.  Apprehensive to do kneeling or stairs.    Wears knee brace most of the time.    Has returned " to work.  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      Prior level of function:  Hiking, rowing machine  Sleep disturbance:  Not disrupted  Pain:     Current pain ratin    Location:  Lateral left knee    Quality: strained.    Pain timing:  Constant    Relieving factors:  Brace and rest    Aggravating factors:  Stair climbing, squatting, kneeling and walking    Progression:  Improving  Social Support:     Lives in:  One-story house    Lives with:  Alone  Diagnostic Tests:     X-ray: abnormal      Diagnostic Tests Comments:  There is mild medial tibiofemoral joint space narrowing bilaterally. There is spurring of the medial femoral condyle and tibial plateau on the right and mild spurring of the lateral femoral condyle and tibial plateau on the left. No fracture or dislocation is seen.  There is mild patellofemoral joint space narrowing and spurring of the patella bilaterally. No dislocation is seen.  Treatments:     Previous treatment:  Injection treatment and medication    Current treatment:  Medication and brace  Patient Goals:     Patient goals for therapy:  Decreased pain, return to sport/leisure activities and increased strength      Past Medical History:   Diagnosis Date   • BMI 38.0-38.9,adult 2012   • Coccyx pain     s/p fall   • Dyslipidemia 2019   • Hypertension      Past Surgical History:   Procedure Laterality Date   • MASS EXCISION GENERAL  2/10/2012    Performed by BRUNILDA LOPEZ at [facility]   • LIPOMA EXCISION Left 2012    left buttock   • CYST EXCISION      right shoulder   • TONSILLECTOMY       Social History     Tobacco Use   • Smoking status: Current Some Day Smoker     Types: Cigarettes   • Smokeless tobacco: Never Used   • Tobacco comment: smoke occasionally when having drink   Substance Use Topics   • Alcohol use: Yes     Alcohol/week: 3.0 - 4.0 oz     Types: 6 - 8 Standard drinks or equivalent per week     Frequency:  Monthly or less     Drinks per session: 1 or 2     Binge frequency: Never     Family and Occupational History     Socioeconomic History   • Marital status: Single     Spouse name: Not on file   • Number of children: Not on file   • Years of education: Not on file   • Highest education level: Not on file   Occupational History   • Not on file       Objective     Tenderness   Left Knee   Tenderness in the quadriceps tendon. No tenderness in the ITB, LCL (distal), MCL (distal), medial joint line, patellar tendon and pes anserinus.     Active Range of Motion   Left Knee   Flexion: 119 degrees with pain  Extension: 0 degrees with pain    Strength:      Left Hip   Planes of Motion   Flexion: 4  Extension: 5 (quad pain)  Abduction: 5  Adduction: 4-    Left Knee   Flexion: 5  Extension: 5  Quadriceps contraction: good    Tests     Left Knee   Positive varus stress test at 30 degrees.   Negative anterior drawer, lateral Rebecca, medial Rebecca, patellar apprehension, patellar compression, posterior drawer and valgus stress test at 30 degrees.         Therapeutic Exercises (CPT 48978):     1. Quad set    2. SAQ with VMO emphasis    3. SAQ with hip add squeeze    4. Hooklying hip add squeeze    5. Standing resisted hip add with band    6. Wall squats    7. Bridges    8. Bishop stretch      Therapeutic Exercise Summary: Pt performed these exercises with instruction and SPV.  Provided handout with these exercises for daily HEP.  Three cups applied to L thigh (two to distal quad, one to mid-ITB) for increased muscle mobility and increased microvascular response.  One minute at rest, one minute with seated heel slides, then moved ITB cup distally, and repeated one minute rest, one minute seated heel slides, then moved distal quad cup laterally, and rested one minute.  Cups removed, redness observed mostly at ITB and lateral distal quad site, mild color change at middle quad.  Pt reports pain at ITB site.      Time-based  treatments/modalities:  Therapeutic exercise minutes (CPT 73492): 10 minutes       Assessment, Response and Plan:   Impairments: abnormal gait, lacks appropriate home exercise program, limited ADL's and pain with function    Assessment details:  39 y.o. male presents with L knee pain and limited strength.  Pt demonstrates hip weakness and malalignment of L patella.  Pt will benefit from skilled PT services to improve posterior chain strength, LE alignment with dynamic activity, and decrease pain.  Prognosis: good    Goals:   Short Term Goals:   1. L knee AROM full and painfree.  2. Pt able to perform 10 squats with proper form and without increased pain.  3. Pt will be independent with daily HEP.  Short term goal time span:  2-4 weeks      Long Term Goals:    1. Pt able to complete 12 hour work shift without knee brace with < 3/10 knee pain  2. Pt able to participate in 30 min of physical activity most days of the week with < 3/10 knee pain.  3. Pt will report increased function via improved scores on WOMAC.  Long term goal time span:  1-2 months    Plan:   Therapy options:  Physical therapy treatment to continue  Planned therapy interventions:  E Stim Unattended (CPT 89988), Manual Therapy (CPT 35535), Neuromuscular Re-education (CPT 70656), Therapeutic Activities (CPT 32270) and Therapeutic Exercise (CPT 04680)  Frequency:  2x week  Duration in weeks:  8  Discussed with:  Patient  Plan details:  Anticipate decreased frequency in ~ 2 weeks as pt progresses and has independent HEP routine.      Functional Assessment Used  WOMAC Grand Total: 34.38     Referring provider co-signature:  I have reviewed this plan of care and my co-signature certifies the need for services.  Certification Dates:   From 04/22/20   To 06/17/20    Physician Signature: ________________________________ Date: ______________

## 2020-04-24 ENCOUNTER — PHYSICAL THERAPY (OUTPATIENT)
Dept: PHYSICAL THERAPY | Facility: REHABILITATION | Age: 40
End: 2020-04-24
Attending: NURSE PRACTITIONER
Payer: COMMERCIAL

## 2020-04-24 DIAGNOSIS — M25.562 PATELLOFEMORAL ARTHRALGIA OF LEFT KNEE: ICD-10-CM

## 2020-04-24 PROCEDURE — 97140 MANUAL THERAPY 1/> REGIONS: CPT

## 2020-04-24 PROCEDURE — 97110 THERAPEUTIC EXERCISES: CPT

## 2020-04-24 PROCEDURE — 97014 ELECTRIC STIMULATION THERAPY: CPT

## 2020-04-24 NOTE — OP THERAPY DAILY TREATMENT
Outpatient Physical Therapy  DAILY TREATMENT     [facility] Physical [address]  [address]  [place] [address]  Phone:  [phone]  Fax:  [phone]    Date: 04/24/2020    Patient: Oswald Schuler  YOB: 1980  MRN: [id number]     Time Calculation  Start time: 0845  Stop time: 0940 Time Calculation (min): 55 minutes       Chief Complaint: Knee Problem    Visit #: 2    SUBJECTIVE:  Pt reports cupping bruises have resolved.  Felt ok following PT session.  Did HEP without pain. Knee only hurts when applying pressure through leg to remove other shoe, or when twisting on planted leg,    OBJECTIVE:        Therapeutic Exercises (CPT 35298):     1. Standing TKE ball smash, x15 x5 sec    2. LAQ with hip add squeeze, x15B    3. B HS curls DKC with ball, x10    4. Standing hip x3 with medium resistance band, x10 all    5. Ball bridges, x10    6. Shuttle leg press, x10, 8 cords, emphasis on avoid hyperext and varus/valgus    7. Bridges, x10    Therapeutic Treatments and Modalities:     1. Manual Therapy (CPT 12830), patellar medial glides and tilt, Gr III-IV, STM distal ITB and VL    2. E Stim Unattended (CPT 19597), Indian 5/5 to left VL/VM with QS x15 min    Time-based treatments/modalities:  Manual therapy minutes (CPT 38050): 8 minutes  Therapeutic exercise minutes (CPT 35174): 20 minutes       ASSESSMENT:   Response to treatment: Pt demo fair cinda for today's interventions.  No increase symptoms following session.    PLAN/RECOMMENDATIONS:   Plan for treatment: therapy treatment to continue next visit.  Planned interventions for next visit: continue with current treatment.

## 2020-04-27 ENCOUNTER — PHYSICAL THERAPY (OUTPATIENT)
Dept: PHYSICAL THERAPY | Facility: REHABILITATION | Age: 40
End: 2020-04-27
Attending: NURSE PRACTITIONER
Payer: COMMERCIAL

## 2020-04-27 DIAGNOSIS — M25.562 PATELLOFEMORAL ARTHRALGIA OF LEFT KNEE: ICD-10-CM

## 2020-04-27 PROCEDURE — 97110 THERAPEUTIC EXERCISES: CPT

## 2020-04-27 PROCEDURE — 97014 ELECTRIC STIMULATION THERAPY: CPT

## 2020-04-27 NOTE — OP THERAPY DAILY TREATMENT
Outpatient Physical Therapy  DAILY TREATMENT     [facility] Physical Therapy [address]  [address]  [place] [address]  Phone:  [phone]  Fax:  [phone]    Date: 04/27/2020    Patient: Oswald Schuler  YOB: 1980  MRN: [id number]     Time Calculation  Start time: 0910  Stop time: 1003 Time Calculation (min): 53 minutes       Chief Complaint: Knee Problem    Visit #: 3    SUBJECTIVE:  Pt states he feels like the leg is stronger and swelling has decreased a little.  Not much pain except for with traction and twisting at the knee joint.    OBJECTIVE:        Therapeutic Exercises (CPT 49734):     1. Standing TKE with med resistance band, x15B    2. Standing incline calf stretch, 2x 30 sec    3. SAQ with 5# ankle weights, x10B      4. SKC HS ball roll, x10B    5. Ball bridges, x10    6. Shuttle leg press, 8 cords [id number], x15B SL.    7. Bridges, x10    8. SLR square, x5B CW/CCW    Therapeutic Treatments and Modalities:     1. Manual Therapy (CPT 09005), patellar medial glides Gr III, STM distal ITB and VL    2. E Stim Unattended (CPT 18520), Maldivian 5/5 to left VL/VM with QS x15 min    Time-based treatments/modalities:  Manual therapy minutes (CPT 86452): 8 minutes  Therapeutic exercise minutes (CPT 09800): 23 minutes       ASSESSMENT:   Response to treatment: Pt presents with improving symptoms and lower extremity function.    PLAN/RECOMMENDATIONS:   Plan for treatment: therapy treatment to continue next visit.  Planned interventions for next visit: continue with current treatment.

## 2020-04-28 ENCOUNTER — EH NON-PROVIDER (OUTPATIENT)
Dept: OCCUPATIONAL MEDICINE | Facility: CLINIC | Age: 40
End: 2020-04-28

## 2020-04-28 PROCEDURE — 94375 RESPIRATORY FLOW VOLUME LOOP: CPT | Performed by: INTERNAL MEDICINE

## 2020-05-01 ENCOUNTER — PHYSICAL THERAPY (OUTPATIENT)
Dept: PHYSICAL THERAPY | Facility: REHABILITATION | Age: 40
End: 2020-05-01
Attending: NURSE PRACTITIONER
Payer: COMMERCIAL

## 2020-05-01 DIAGNOSIS — M25.562 PATELLOFEMORAL ARTHRALGIA OF LEFT KNEE: ICD-10-CM

## 2020-05-01 PROCEDURE — 97110 THERAPEUTIC EXERCISES: CPT

## 2020-05-01 NOTE — OP THERAPY DAILY TREATMENT
Outpatient Physical Therapy  DAILY TREATMENT     [facility] Physical Therapy [address]  [address]  [place] [address]  Phone:  [phone]  Fax:  [phone]    Date: 05/01/2020    Patient: Oswald Schuler  YOB: 1980  MRN: [id number]     Time Calculation  Start time: 1515  Stop time: 1545 Time Calculation (min): 30 minutes       Chief Complaint: Knee Problem    Visit #: 4    SUBJECTIVE:  Overall, having less knee pain.  Wears knee brace only when at work.      OBJECTIVE:        Therapeutic Exercises (CPT 17616):     1. Standing TKE with med-light resistance band, x15B    2. Standing incline calf stretch, 2x 30 sec    4. Standing hip add with med- light resistance band, x15B    5. Ball squats, x15    6. Bridges, x10 regular, x10 with hip abd, x10 with hip add    7. HS stretch at 90/90, x10B    8. SLR square, x5B CW/CCW    Therapeutic Treatments and Modalities:     1. Manual Therapy (CPT 03456), patellar medial glides Gr III, STM distal ITB and lateral HS    Time-based treatments/modalities:  Manual therapy minutes (CPT 33569): 6 minutes  Therapeutic exercise minutes (CPT 23020): 24 minutes       ASSESSMENT:   Response to treatment: Fatigued and mild medial left knee soreness following today's interventions.    PLAN/RECOMMENDATIONS:   Plan for treatment: therapy treatment to continue next visit.  Planned interventions for next visit: continue with current treatment.

## 2020-05-04 ENCOUNTER — PHYSICAL THERAPY (OUTPATIENT)
Dept: PHYSICAL THERAPY | Facility: REHABILITATION | Age: 40
End: 2020-05-04
Attending: NURSE PRACTITIONER
Payer: COMMERCIAL

## 2020-05-04 DIAGNOSIS — M25.562 PATELLOFEMORAL ARTHRALGIA OF LEFT KNEE: ICD-10-CM

## 2020-05-04 PROCEDURE — 97014 ELECTRIC STIMULATION THERAPY: CPT

## 2020-05-04 PROCEDURE — 97110 THERAPEUTIC EXERCISES: CPT

## 2020-05-04 NOTE — OP THERAPY DAILY TREATMENT
Outpatient Physical Therapy  DAILY TREATMENT     [facility] Physical Therapy [address]  [address]  [place] [address]  Phone:  [phone]  Fax:  [phone]    Date: 05/04/2020    Patient: Oswald Schuler  YOB: 1980  MRN: [id number]     Time Calculation  Start time: 1013  Stop time: 1100 Time Calculation (min): 47 minutes       Chief Complaint: Knee Problem    Visit #: 5    SUBJECTIVE:  Using stationary bike once or multiple times a day without increased knee pain.   Only minimal L inf knee pain when walking over the past few days.  States it is getting better.    OBJECTIVE:        Therapeutic Exercises (CPT 08996):     1. Standing TKE with med resistance band, x15B    2. Standing incline calf stretch, 2x 30 sec    4. Standing hip add with med resistance band, x15B    5. Ball squats, x15    6. Bridges, x10 regular, x10 with hip abd, x10 with hip add    7. HS stretch at 90/90, x10B    Therapeutic Treatments and Modalities:     1. Manual Therapy (CPT 49995), patellar medial and sup glides Gr III.    2. E Stim Unattended (CPT 92299), Russian stim L VL/VM, 5sec/5sec x15 min with SAQ    Time-based treatments/modalities:  Manual therapy minutes (CPT 80240): 6 minutes  Therapeutic exercise minutes (CPT 24289): 23 minutes     ASSESSMENT:   Response to treatment: Improved knee symptoms.    PLAN/RECOMMENDATIONS:   Plan for treatment: therapy treatment to continue next visit.  Planned interventions for next visit: continue with current treatment.

## 2020-05-06 ENCOUNTER — PHYSICAL THERAPY (OUTPATIENT)
Dept: PHYSICAL THERAPY | Facility: REHABILITATION | Age: 40
End: 2020-05-06
Attending: NURSE PRACTITIONER
Payer: COMMERCIAL

## 2020-05-06 DIAGNOSIS — M25.562 PATELLOFEMORAL ARTHRALGIA OF LEFT KNEE: ICD-10-CM

## 2020-05-06 PROCEDURE — 97110 THERAPEUTIC EXERCISES: CPT

## 2020-05-06 PROCEDURE — 97014 ELECTRIC STIMULATION THERAPY: CPT

## 2020-05-06 NOTE — OP THERAPY DAILY TREATMENT
Outpatient Physical Therapy  DAILY TREATMENT     [facility] [address]  [address]  [place] [address]  Phone:  [phone]  Fax:  [phone]    Date: 05/06/2020    Patient: Oswald Schuler  YOB: 1980  MRN: [id number]     Time Calculation  Start time: 1015  Stop time: 1105 Time Calculation (min): 50 minutes       Chief Complaint: Knee Problem    Visit #: 6    SUBJECTIVE:  Knee has been pretty good.  Has not worn knee brace for 6 days off work.  Has been working on his bike and knee felt good.  Gets occasional snapping in knee.    OBJECTIVE:        Therapeutic Exercises (CPT 52697):     1. Squats on Airex, x10    2. Standing incline calf stretch, 2x 30 sec    3. SL cone taps 10, 12, 2, x5 B, pain in L knee with L SL stance not allowing terminal knee extension    4. Lunge to BOSU, alt R/L x10, demo instability    5. Ball squats, x15    6. Bridges, x10 regular, x10 with hip add ball squeeze    7. HS stretch at 90/90, x10B    8. Crossed leg LTR, x30 sec B    9. Bishop stretch, x30 sec B    Therapeutic Treatments and Modalities:     1. Manual Therapy (CPT 12731), patellar medial glides Gr III, STM distal VL and ITB    2. E Stim Unattended (CPT 46988), Russian stim L VL/VM, 10sec/10sec x15 min with SAQ    Time-based treatments/modalities:  Manual therapy minutes (CPT 79477): 5 minutes  Therapeutic exercise minutes (CPT 98665): 25 minutes       ASSESSMENT:   Response to treatment: Pt demo improved knee symptoms.    PLAN/RECOMMENDATIONS:   Plan for treatment: therapy treatment to continue next visit.  Re-assess POC, goals, symptoms, function and HEP.  Possible dc next visit.  Planned interventions for next visit: continue with current treatment.

## 2020-05-11 ENCOUNTER — APPOINTMENT (OUTPATIENT)
Dept: PHYSICAL THERAPY | Facility: REHABILITATION | Age: 40
End: 2020-05-11
Attending: NURSE PRACTITIONER
Payer: COMMERCIAL

## 2020-05-13 ENCOUNTER — APPOINTMENT (OUTPATIENT)
Dept: PHYSICAL THERAPY | Facility: REHABILITATION | Age: 40
End: 2020-05-13
Attending: NURSE PRACTITIONER
Payer: COMMERCIAL

## 2020-06-09 ENCOUNTER — TELEPHONE (OUTPATIENT)
Dept: PHYSICAL THERAPY | Facility: REHABILITATION | Age: 40
End: 2020-06-09

## 2020-06-09 NOTE — OP THERAPY DISCHARGE SUMMARY
Outpatient Physical Therapy  DISCHARGE SUMMARY NOTE      [facility] [address].  [address]  [place] [address]  Phone:  [phone]  Fax:  [phone]    Date of Visit: 06/09/2020    Patient: Oswald Schuler  YOB: 1980  MRN: [id number]     Referring Provider: BROWN Chen  [address] [place] [id number]   Referring Diagnosis Patellofemoral arthralgia of left knee [M25.562]             Your patient is being discharged from Physical Therapy with the following comments:   · Goals partially met    Comments:  Pt did not return for last follow up visit, but demo improved function and symptoms.      Ashlie Way, PT    Date: 6/9/2020

## 2020-07-14 ENCOUNTER — NON-PROVIDER VISIT (OUTPATIENT)
Dept: OCCUPATIONAL MEDICINE | Facility: CLINIC | Age: 40
End: 2020-07-14

## 2020-07-14 ENCOUNTER — HOSPITAL ENCOUNTER (OUTPATIENT)
Facility: MEDICAL CENTER | Age: 40
End: 2020-07-14
Attending: NURSE PRACTITIONER
Payer: COMMERCIAL

## 2020-07-14 DIAGNOSIS — Z11.59 ENCOUNTER FOR SCREENING FOR OTHER VIRAL DISEASES: ICD-10-CM

## 2020-07-14 LAB — COVID ORDER STATUS COVID19: NORMAL

## 2020-07-14 PROCEDURE — U0003 INFECTIOUS AGENT DETECTION BY NUCLEIC ACID (DNA OR RNA); SEVERE ACUTE RESPIRATORY SYNDROME CORONAVIRUS 2 (SARS-COV-2) (CORONAVIRUS DISEASE [COVID-19]), AMPLIFIED PROBE TECHNIQUE, MAKING USE OF HIGH THROUGHPUT TECHNOLOGIES AS DESCRIBED BY CMS-2020-01-R: HCPCS | Performed by: NURSE PRACTITIONER

## 2020-07-15 LAB
SARS-COV-2 RNA RESP QL NAA+PROBE: NOTDETECTED
SPECIMEN SOURCE: NORMAL

## 2020-07-17 ENCOUNTER — TELEPHONE (OUTPATIENT)
Dept: OCCUPATIONAL MEDICINE | Facility: CLINIC | Age: 40
End: 2020-07-17

## 2020-07-17 NOTE — TELEPHONE ENCOUNTER
Phone Number Called: [phone] (home)       Call outcome: Did not leave a detailed message. Requested patient to call back.    Message:

## 2020-08-20 DIAGNOSIS — I10 ESSENTIAL HYPERTENSION: ICD-10-CM

## 2020-08-20 RX ORDER — LISINOPRIL 20 MG/1
TABLET ORAL
Qty: 90 TAB | Refills: 3 | Status: SHIPPED | OUTPATIENT
Start: 2020-08-20 | End: 2020-08-24 | Stop reason: SDUPTHER

## 2020-08-21 NOTE — TELEPHONE ENCOUNTER
Requested Prescriptions     Signed Prescriptions Disp Refills   • lisinopril (PRINIVIL) 20 MG Tab 90 Tab 3     Sig: TAKE ONE TABLET BY MOUTH ONE TIME DAILY     Authorizing Provider: ITALIA BEAVERS A.P.R.N.

## 2020-08-21 NOTE — TELEPHONE ENCOUNTER
Received request via: Pharmacy    Was the patient seen in the last year in this department? Yes lov 4/16/2020    Does the patient have an active prescription (recently filled or refills available) for medication(s) requested? No

## 2020-08-24 ENCOUNTER — OFFICE VISIT (OUTPATIENT)
Dept: MEDICAL GROUP | Facility: PHYSICIAN GROUP | Age: 40
End: 2020-08-24
Payer: COMMERCIAL

## 2020-08-24 VITALS
RESPIRATION RATE: 16 BRPM | SYSTOLIC BLOOD PRESSURE: 124 MMHG | WEIGHT: 315 LBS | BODY MASS INDEX: 42.66 KG/M2 | TEMPERATURE: 97 F | HEIGHT: 72 IN | OXYGEN SATURATION: 93 % | DIASTOLIC BLOOD PRESSURE: 76 MMHG | HEART RATE: 99 BPM

## 2020-08-24 DIAGNOSIS — I10 ESSENTIAL HYPERTENSION: ICD-10-CM

## 2020-08-24 DIAGNOSIS — Z13.29 SCREENING FOR ENDOCRINE, METABOLIC AND IMMUNITY DISORDER: ICD-10-CM

## 2020-08-24 DIAGNOSIS — Z13.228 SCREENING FOR ENDOCRINE, METABOLIC AND IMMUNITY DISORDER: ICD-10-CM

## 2020-08-24 DIAGNOSIS — Z20.828 CONTACT WITH AND (SUSPECTED) EXPOSURE TO OTHER VIRAL COMMUNICABLE DISEASES: ICD-10-CM

## 2020-08-24 DIAGNOSIS — Z00.00 ANNUAL VISIT FOR GENERAL ADULT MEDICAL EXAMINATION WITHOUT ABNORMAL FINDINGS: ICD-10-CM

## 2020-08-24 DIAGNOSIS — R73.9 HYPERGLYCEMIA: ICD-10-CM

## 2020-08-24 DIAGNOSIS — Z13.0 SCREENING FOR ENDOCRINE, METABOLIC AND IMMUNITY DISORDER: ICD-10-CM

## 2020-08-24 DIAGNOSIS — E78.5 DYSLIPIDEMIA: ICD-10-CM

## 2020-08-24 DIAGNOSIS — E55.9 VITAMIN D DEFICIENCY: ICD-10-CM

## 2020-08-24 PROBLEM — M54.50 ACUTE RIGHT-SIDED LOW BACK PAIN WITHOUT SCIATICA: Status: RESOLVED | Noted: 2019-10-08 | Resolved: 2020-08-24

## 2020-08-24 PROCEDURE — 99396 PREV VISIT EST AGE 40-64: CPT | Performed by: NURSE PRACTITIONER

## 2020-08-24 RX ORDER — LISINOPRIL 20 MG/1
TABLET ORAL
Qty: 90 TAB | Refills: 3 | Status: SHIPPED | OUTPATIENT
Start: 2020-08-24 | End: 2020-11-11 | Stop reason: SDUPTHER

## 2020-08-24 RX ORDER — HYDROCHLOROTHIAZIDE 25 MG/1
TABLET ORAL
Qty: 90 TAB | Refills: 3 | Status: SHIPPED | OUTPATIENT
Start: 2020-08-24 | End: 2020-11-11 | Stop reason: SDUPTHER

## 2020-08-24 RX ORDER — AMLODIPINE BESYLATE 5 MG/1
TABLET ORAL
Qty: 90 TAB | Refills: 3 | Status: SHIPPED | OUTPATIENT
Start: 2020-08-24 | End: 2020-11-11 | Stop reason: SDUPTHER

## 2020-08-24 SDOH — HEALTH STABILITY: MENTAL HEALTH: HOW OFTEN DO YOU HAVE A DRINK CONTAINING ALCOHOL?: 2-4 TIMES A MONTH

## 2020-08-24 ASSESSMENT — FIBROSIS 4 INDEX: FIB4 SCORE: 0.69

## 2020-08-24 NOTE — ASSESSMENT & PLAN NOTE
Chronic medical problem.  His BMI today is 47.47.  He is due for repeat labs.  Last lab results:  Results for CHRIS SINGLETON (MRN [id number]) as of 8/24/2020 08:06   Ref. Range 11/14/2019 11:27   Glucose Latest Ref Range: 65 - 99 mg/dL 93     Results for CHRIS SINGLETON (MRN [id number]) as of 8/24/2020 08:06   Ref. Range 11/14/2019 11:27   Glycohemoglobin Latest Ref Range: 0.0 - 5.6 % 5.1

## 2020-08-24 NOTE — ASSESSMENT & PLAN NOTE
Chronic medical problem. He is taking amlodipine 5 mg, hctz 25 mg daily and lisinopril 20 mg daily. His BP today 124/76.  He denies any chest pain, shortness of breath, dizziness, palpitations, or headaches.

## 2020-08-24 NOTE — ASSESSMENT & PLAN NOTE
Chronic medical problem. He is taking ergocalciferol 50,000 units weekly when he remembers to take it.  He is due for repeat labs.  Last lab results:  Results for MANI CHRIS CYR (MRN [id number]) as of 8/24/2020 08:06   Ref. Range 11/14/2019 11:27   25-Hydroxy   Vitamin D 25 Latest Ref Range: 30 - 100 ng/mL 18 (L)

## 2020-08-24 NOTE — PROGRESS NOTES
Subjective:     CC:   Chief Complaint   Patient presents with   • Annual Exam   • Requesting Labs       HPI:   Chris Schuler is a 40 y.o. male who presents for an annual exam. He is feeling well and has no complaints.      Essential hypertension  Chronic medical problem. He is taking amlodipine 5 mg, hctz 25 mg daily and lisinopril 20 mg daily. His BP today 124/76.  He denies any chest pain, shortness of breath, dizziness, palpitations, or headaches.    Vitamin D deficiency  Chronic medical problem. He is taking ergocalciferol 50,000 units weekly when he remembers to take it.  He is due for repeat labs.  Last lab results:  Results for CHRIS SCHULER (MRN [id number]) as of 8/24/2020 08:06   Ref. Range 11/14/2019 11:27   25-Hydroxy   Vitamin D 25 Latest Ref Range: 30 - 100 ng/mL 18 (L)       Dyslipidemia  Chronic medical problem. He is taking coenzyme Q10. He was not taking any cholesterol-lowering medication.  He would like his labs completed.  Last lab results:  Results for CHRIS SCHULER (MRN [id number]) as of 8/24/2020 08:06   Ref. Range 11/14/2019 11:27   Cholesterol,Tot Latest Ref Range: 100 - 199 mg/dL 230 (H)   Triglycerides Latest Ref Range: 0 - 149 mg/dL 304 (H)   HDL Latest Ref Range: >=40 mg/dL 49   LDL Latest Ref Range: <100 mg/dL 120 (H)       Hyperglycemia  Chronic medical problem.  His BMI today is 47.47.  He is due for repeat labs.  Last lab results:  Results for CHRIS SCHULER (MRN [id number]) as of 8/24/2020 08:06   Ref. Range 11/14/2019 11:27   Glucose Latest Ref Range: 65 - 99 mg/dL 93     Results for CHRIS SCHULER (MRN [id number]) as of 8/24/2020 08:06   Ref. Range 11/14/2019 11:27   Glycohemoglobin Latest Ref Range: 0.0 - 5.6 % 5.1       BMI 45.0-49.9, adult (HCC)  Chronic medical problem.  His BMI today is 47.47.  His last BMI was 46.79.      Health Maintenance  Advanced directive: n/a   PT/vit D for falls prevention: On multivitamin daily   Cholesterol Screening:  Ordered today 8/24/2020   Diabetes Screening: Ordered today 8/24/2020   AAA Screening: n/a   Aspirin Use:     Diet: He admits that his diet has not been the best. He has been eating fast food, no fries, oatmeal, almonds, no soda. He does drink gatorade and crawford.   Exercise: He has not been exercising recently.  He is motivated to get back on track. He has experienced some recent personal stressors.    Substance Abuse: He drinks a couple times a month, no more than 6 drinks on same day.   Safe in relationship.   Seat belts.  Sun protection used.    Cancer screening  Colorectal Cancer Screening: n/a    Lung Cancer Screening: n/a    Prostate Cancer Screening/PSA: n/a     Infectious disease screening/Immunizations  --STI Screening: denies  --Practices safe sex.  --HIV Screening: n/a   --Hepatitis C Screening: n/a   --Immunizations:    Influenza: Last 9/14/2019, due for this season    HPV:  n/a    Tetanus: Last 7/11/2013, due 2023   Shingles: n/a    Pneumococcal : declines today     Other immunizations: n/a     He  has a past medical history of Acute right-sided low back pain without sciatica (10/8/2019), BMI 38.0-38.9,adult (8/9/2012), Coccyx pain (2010), Dyslipidemia (4/17/2019), and Hypertension.  He  has a past surgical history that includes tonsillectomy; cyst excision; mass excision general (2/10/2012); and lipoma excision (Left, 2012).  Family History   Problem Relation Age of Onset   • No Known Problems Mother    • Hypertension Father    • Cancer Maternal Grandfather      Social History     Tobacco Use   • Smoking status: Current Some Day Smoker     Types: Cigarettes   • Smokeless tobacco: Never Used   • Tobacco comment: smoke occasionally when having drink   Substance Use Topics   • Alcohol use: Yes     Alcohol/week: 3.0 - 4.0 oz     Types: 6 - 8 Standard drinks or equivalent per week     Frequency: 2-4 times a month     Drinks per session: 1 or 2     Binge frequency: Never   • Drug use: No       Patient Active  Problem List    Diagnosis Date Noted   • Patellofemoral arthralgia of left knee 04/15/2020   • BMI 45.0-49.9, adult (HCC) 11/14/2019   • Other fatigue 11/14/2019   • Fatty liver 11/14/2019   • Hypomagnesemia 08/29/2019   • Dyslipidemia 04/17/2019   • Hyperglycemia 02/21/2019   • Vitamin D deficiency 02/21/2019   • Essential hypertension 02/26/2016       Current Outpatient Medications   Medication Sig Dispense Refill   • amLODIPine (NORVASC) 5 MG Tab TAKE ONE TABLET BY MOUTH ONE TIME DAILY 90 Tab 3   • hydroCHLOROthiazide (HYDRODIURIL) 25 MG Tab TAKE ONE TABLET BY MOUTH ONE TIME DAILY 90 Tab 3   • lisinopril (PRINIVIL) 20 MG Tab TAKE ONE TABLET BY MOUTH ONE TIME DAILY 90 Tab 3   • vitamin D, Ergocalciferol, (DRISDOL) 1.25 MG (75270 UT) Cap capsule take one capsule by mouth once a week 12 Cap 1   • Coenzyme Q10 (CO Q-10) 100 MG Cap Take  by mouth.     • Multiple Vitamin (MULTI VITAMIN DAILY PO) Take  by mouth.     • diclofenac EC (VOLTAREN) 75 MG Tablet Delayed Response Take 1 Tab by mouth 2 times a day. (Patient not taking: Reported on 4/16/2020) 30 Tab 0   • Diclofenac Sodium 1 % Gel Apply BID to knee for pain (Patient not taking: Reported on 4/16/2020) 1 Tube 0     No current facility-administered medications for this visit.     (including changes today)  Allergies: Patient has no known allergies.    Review of Systems   Constitutional: Negative for fever, chills and fatigue.   HENT: Negative for congestion.    Eyes: Negative for pain.   Respiratory: Negative for cough and shortness of breath.    Cardiovascular: Negative for chest pain.   Gastrointestinal: Negative for nausea, vomiting, abdominal pain and diarrhea.   Genitourinary: Negative for dysuria and hematuria.   Skin: Negative for rash.   Neurological: Negative for dizziness and headaches.   Endo/Heme/Allergies: Does not bruise/bleed easily.   Psychiatric/Behavioral: Negative for depression.  The patient is not nervous/anxious.      Objective:     Vital signs  reviewed   /76 (BP Location: Right arm, Patient Position: Sitting, BP Cuff Size: Large adult)   Pulse 99   Temp 36.1 °C (97 °F) (Temporal)   Resp 16   Ht 1.829 m (6')   Wt (!) 158.8 kg (350 lb)   SpO2 93%   BMI 47.47 kg/m²   Body mass index is 47.47 kg/m².  Wt Readings from Last 4 Encounters:   08/24/20 (!) 158.8 kg (350 lb)   04/16/20 (!) 156.5 kg (345 lb)   04/15/20 (!) 149.7 kg (330 lb)   03/24/20 (!) 149.7 kg (330 lb)       Physical Exam:  Constitutional: Well-developed and well-nourished. Not diaphoretic. No distress.   Skin: Skin is warm and dry. No rash noted.  Head: Atraumatic without lesions.  Eyes: Conjunctivae and extraocular motions are normal. Pupils are equal, round, and reactive to light. No scleral icterus.   Ears:  External ears unremarkable. Tympanic membranes clear and intact.  Nose: Deferred due to COVID-19, no complaints.    Mouth/Throat: Deferred due to COVID-19, no complaints.   Neck: Supple, trachea midline. Normal range of motion. No thyromegaly present. No lymphadenopathy--cervical or supraclavicular.  Cardiovascular: Regular rate and rhythm, S1 and S2 without murmur, rubs, or gallops.    Lungs: Effort normal. Clear to auscultation throughout. No adventitious sounds. No CVA tenderness.  Abdomen: Soft, non tender, and without distention. Active bowel sounds in all four quadrants. No rebound, guarding, masses or HSM. Obese.   : Deferred, no complaints  Rectal: deferred  Prostate: deferred  Extremities: No cyanosis, clubbing, erythema, nor edema. Distal pulses intact and symmetric.   Musculoskeletal: All major joints AROM full in all directions without pain.  Neurological: Alert and oriented x 3. DTRs 2+/3 and symmetric. No cranial nerve deficit. 5/5 myotomes. Sensation intact.   Psychiatric:  Behavior, mood, and affect are appropriate.      Assessment and Plan:     1. Annual visit for general adult medical examination without abnormal findings  New problem to examiner.  Annual  exam completed today.  No acute complaints.    2. Essential hypertension  Chronic stable medical problem.  Continue amlodipine, hydrochlorothiazide, and lisinopril.  BP at goal today. Due for labs.  Orders placed.  Monitor and follow-up via [employer] [employer].  - CBC WITH DIFFERENTIAL; Future  - Comp Metabolic Panel; Future  - amLODIPine (NORVASC) 5 MG Tab; TAKE ONE TABLET BY MOUTH ONE TIME DAILY  Dispense: 90 Tab; Refill: 3  - hydroCHLOROthiazide (HYDRODIURIL) 25 MG Tab; TAKE ONE TABLET BY MOUTH ONE TIME DAILY  Dispense: 90 Tab; Refill: 3  - lisinopril (PRINIVIL) 20 MG Tab; TAKE ONE TABLET BY MOUTH ONE TIME DAILY  Dispense: 90 Tab; Refill: 3    3. Dyslipidemia  Chronic stable medical problem.  Continue diet and lifestyle modifications.  Due for labs.  Orders placed.  Monitor and follow-up via [employer].  - Comp Metabolic Panel; Future  - Lipid Profile; Future    4. Hyperglycemia  Chronic stable medical problem.  Continue diet and lifestyle modifications.  Due for labs.  Orders placed.  Monitor and follow-up via [facility].  - HEMOGLOBIN A1C; Future    5. Vitamin D deficiency  Chronic unstable medical problem.  Continue ergocalciferol.  Due for labs.  Orders placed.  Monitor and follow up via [facility].  - VITAMIN D,25 HYDROXY; Future    6. BMI 45.0-49.9, adult (HCC)  Chronic stable medical problem.  Continue diet and lifestyle modifications.  Monitor and follow.    7. Contact with and (suspected) exposure to other viral communicable diseases  New problem to examiner.  Patient elected antibody testing, orders placed.  Monitor and follow-up via [facility].  - SARS CoV-2 Ab, Total; Future    8. Screening for endocrine, metabolic and immunity disorder  New problem to examiner.  Screening indicated.  Patient is in agreement.  Orders placed.  Monitor and follow-up via [facility].  - TSH WITH REFLEX TO FT4; Future      HCM: Due for pneumovax today, he declines.  Labs per orders.  Vaccinations per orders.  Counseling about diet, supplements,  exercise, skin care and safe sex.    Follow-up: Return in about 1 year (around 8/24/2021), or as needed.

## 2020-08-24 NOTE — ASSESSMENT & PLAN NOTE
Chronic medical problem. He is taking coenzyme Q10. He was not taking any cholesterol-lowering medication.  He would like his labs completed.  Last lab results:  Results for CHRIS SINGLETON (MRN [id number]) as of 8/24/2020 08:06   Ref. Range 11/14/2019 11:27   Cholesterol,Tot Latest Ref Range: 100 - 199 mg/dL 230 (H)   Triglycerides Latest Ref Range: 0 - 149 mg/dL 304 (H)   HDL Latest Ref Range: >=40 mg/dL 49   LDL Latest Ref Range: <100 mg/dL 120 (H)

## 2020-09-17 ENCOUNTER — OFFICE VISIT (OUTPATIENT)
Dept: URGENT CARE | Facility: PHYSICIAN GROUP | Age: 40
End: 2020-09-17
Payer: COMMERCIAL

## 2020-09-17 VITALS
HEART RATE: 114 BPM | HEIGHT: 72 IN | WEIGHT: 315 LBS | SYSTOLIC BLOOD PRESSURE: 138 MMHG | OXYGEN SATURATION: 98 % | DIASTOLIC BLOOD PRESSURE: 82 MMHG | TEMPERATURE: 97 F | BODY MASS INDEX: 42.66 KG/M2 | RESPIRATION RATE: 18 BRPM

## 2020-09-17 DIAGNOSIS — K08.89 PAIN, DENTAL: ICD-10-CM

## 2020-09-17 PROCEDURE — 99214 OFFICE O/P EST MOD 30 MIN: CPT | Performed by: PHYSICIAN ASSISTANT

## 2020-09-17 RX ORDER — CHLORHEXIDINE GLUCONATE ORAL RINSE 1.2 MG/ML
15 SOLUTION DENTAL 2 TIMES DAILY
Qty: 60 ML | Refills: 0 | Status: SHIPPED | OUTPATIENT
Start: 2020-09-17

## 2020-09-17 RX ORDER — AMOXICILLIN AND CLAVULANATE POTASSIUM 875; 125 MG/1; MG/1
1 TABLET, FILM COATED ORAL 2 TIMES DAILY
Qty: 14 TAB | Refills: 0 | Status: SHIPPED | OUTPATIENT
Start: 2020-09-17 | End: 2020-09-24

## 2020-09-17 ASSESSMENT — FIBROSIS 4 INDEX: FIB4 SCORE: 0.69

## 2020-09-17 ASSESSMENT — ENCOUNTER SYMPTOMS
COUGH: 0
WHEEZING: 0
FEVER: 0
SORE THROAT: 0
SHORTNESS OF BREATH: 0
CHILLS: 0
HEADACHES: 0

## 2020-09-17 NOTE — PROGRESS NOTES
Subjective:   Oswald Schuler is a 40 y.o. male who presents for Oral Pain (bottom gum [id number] )      Oral Pain  Pertinent negatives include no chest pain, chills, coughing, fever, headaches or sore throat.   Patient presents to clinic complaining of pain to his right lower molar onset 3 to 4 days.  He states he may have wedged a piece of food in between his teeth which inflamed his gums.  He then notes gradual worsening pain in radiation of pain to his right jaw.  He is concerned of possible infection.  Pain is mild to moderate worse with chewing.  He otherwise denies any other symptoms.  Denies any fevers, chills, sore throat, trouble swallowing, difficulty breathing, wheezing, chest pains or any other complaints.   He states he recently saw his dentist for cleaning.    Review of Systems   Constitutional: Negative for chills and fever.   HENT: Negative for ear pain and sore throat.         Dental pain   Respiratory: Negative for cough, shortness of breath and wheezing.    Cardiovascular: Negative for chest pain.   Neurological: Negative for headaches.       Medications:    • amLODIPine Tabs  • Co Q-10 Caps  • diclofenac  Tbec  • Diclofenac Sodium Gel  • hydroCHLOROthiazide Tabs  • lisinopril Tabs  • MULTI VITAMIN DAILY PO  • vitamin D (Ergocalciferol) Caps    Allergies: Patient has no known allergies.    Problem List: Oswald Schuler has Essential hypertension; Hyperglycemia; Vitamin D deficiency; Dyslipidemia; Hypomagnesemia; BMI 45.0-49.9, adult (HCC); Other fatigue; Fatty liver; and Patellofemoral arthralgia of left knee on their problem list.    Surgical History:  Past Surgical History:   Procedure Laterality Date   • MASS EXCISION GENERAL  2/10/2012    Performed by BRUNILDA LOPEZ at [facility]   • LIPOMA EXCISION Left 2012    left buttock   • CYST EXCISION      right shoulder   • TONSILLECTOMY         Past Social Hx: Oswald Schuler  reports that he has been smoking  cigarettes. He has never used smokeless tobacco. He reports current alcohol use of about 3.0 - 4.0 oz of alcohol per week. He reports that he does not use drugs.     Past Family Hx:  Oswald Schuler family history includes Cancer in his maternal grandfather; Hypertension in his father; No Known Problems in his mother.     Problem list, medications, and allergies reviewed by myself today in Epic.     Objective:     /82   Pulse (!) 114   Temp 36.1 °C (97 °F) (Temporal)   Resp 18   Ht 1.829 m (6')   Wt (!) 153.8 kg (339 lb)   SpO2 98%   BMI 45.98 kg/m²     Physical Exam  Vitals signs reviewed.   Constitutional:       General: He is not in acute distress.     Appearance: Normal appearance. He is not ill-appearing or toxic-appearing.   HENT:      Right Ear: Tympanic membrane normal.      Left Ear: Tympanic membrane normal.      Mouth/Throat:      Lips: Pink.      Mouth: Mucous membranes are moist.      Dentition: No gingival swelling.      Pharynx: Oropharynx is clear. Uvula midline. No pharyngeal swelling, oropharyngeal exudate, posterior oropharyngeal erythema or uvula swelling.      Tonsils: No tonsillar exudate or tonsillar abscesses.        Comments: Area above: Area of perceived pain.  Mild erythema without any edema, bleeding, purulent drainage, or abscess.  There is tenderness to palpation.  Eyes:      Conjunctiva/sclera: Conjunctivae normal.      Pupils: Pupils are equal, round, and reactive to light.   Neck:      Musculoskeletal: Neck supple. No neck rigidity or pain with movement.   Cardiovascular:      Rate and Rhythm: Normal rate.      Heart sounds: Normal heart sounds.   Pulmonary:      Effort: Pulmonary effort is normal. No respiratory distress.      Breath sounds: Normal breath sounds. No wheezing, rhonchi or rales.   Lymphadenopathy:      Cervical: No cervical adenopathy.   Skin:     General: Skin is warm and dry.   Neurological:      General: No focal deficit present.      Mental  Status: He is alert and oriented to person, place, and time.   Psychiatric:         Mood and Affect: Mood normal.         Behavior: Behavior normal.         Assessment/Plan:     Diagnosis and associated orders:     1. Pain, dental  amoxicillin-clavulanate (AUGMENTIN) 875-125 MG Tab    chlorhexidine (PERIDEX) 0.12 % Solution      Comments/MDM:     • We will cover for possible underlying dental infection with Augmentin   • Chlorhexidine mouthwash   • Dental hygiene   • Recommended ibuprofen and Tylenol alternating for pain control.   • Follow-up with dentist for further evaluation and treatment.   • Overall, patient is well-appearing in no acute distress, normal vital signs on examination and no dental abscess.  Suspicions for emergent pathology are low.       Red flags discussed and indications to immediately call 911 or present to the Emergency Department.   Supportive care, differential diagnoses, and indications for immediate follow-up discussed with patient.    Pathogenesis of diagnosis discussed including typical length and natural progression. Patient expresses understanding and agrees to plan.    Advised the patient to follow-up with the primary care physician for recheck, reevaluation, and consideration of further management.    Please note that this dictation was created using voice recognition software. I have made a reasonable attempt to correct obvious errors, but I expect that there are errors of grammar and possibly content that I did not discover before finalizing the note.    This note was electronically signed by Von Payton PA-C

## 2020-09-28 DIAGNOSIS — M25.562 PATELLOFEMORAL ARTHRALGIA OF LEFT KNEE: ICD-10-CM

## 2020-10-05 ENCOUNTER — HOSPITAL ENCOUNTER (OUTPATIENT)
Dept: LAB | Facility: MEDICAL CENTER | Age: 40
End: 2020-10-05
Attending: NURSE PRACTITIONER
Payer: COMMERCIAL

## 2020-10-05 DIAGNOSIS — E78.5 DYSLIPIDEMIA: ICD-10-CM

## 2020-10-05 DIAGNOSIS — R73.9 HYPERGLYCEMIA: ICD-10-CM

## 2020-10-05 DIAGNOSIS — I10 ESSENTIAL HYPERTENSION: ICD-10-CM

## 2020-10-05 DIAGNOSIS — Z13.228 SCREENING FOR ENDOCRINE, METABOLIC AND IMMUNITY DISORDER: ICD-10-CM

## 2020-10-05 DIAGNOSIS — Z13.0 SCREENING FOR ENDOCRINE, METABOLIC AND IMMUNITY DISORDER: ICD-10-CM

## 2020-10-05 DIAGNOSIS — E55.9 VITAMIN D DEFICIENCY: ICD-10-CM

## 2020-10-05 DIAGNOSIS — Z20.828 CONTACT WITH AND (SUSPECTED) EXPOSURE TO OTHER VIRAL COMMUNICABLE DISEASES: ICD-10-CM

## 2020-10-05 DIAGNOSIS — Z13.29 SCREENING FOR ENDOCRINE, METABOLIC AND IMMUNITY DISORDER: ICD-10-CM

## 2020-10-05 LAB
25(OH)D3 SERPL-MCNC: 25 NG/ML (ref 30–100)
ALBUMIN SERPL BCP-MCNC: 4.3 G/DL (ref 3.2–4.9)
ALBUMIN/GLOB SERPL: 1.3 G/DL
ALP SERPL-CCNC: 77 U/L (ref 30–99)
ALT SERPL-CCNC: 29 U/L (ref 2–50)
ANION GAP SERPL CALC-SCNC: 13 MMOL/L (ref 7–16)
AST SERPL-CCNC: 26 U/L (ref 12–45)
BASOPHILS # BLD AUTO: 0.8 % (ref 0–1.8)
BASOPHILS # BLD: 0.08 K/UL (ref 0–0.12)
BILIRUB SERPL-MCNC: 0.6 MG/DL (ref 0.1–1.5)
BUN SERPL-MCNC: 14 MG/DL (ref 8–22)
CALCIUM SERPL-MCNC: 9.5 MG/DL (ref 8.5–10.5)
CHLORIDE SERPL-SCNC: 95 MMOL/L (ref 96–112)
CHOLEST SERPL-MCNC: 193 MG/DL (ref 100–199)
CO2 SERPL-SCNC: 26 MMOL/L (ref 20–33)
CREAT SERPL-MCNC: 0.85 MG/DL (ref 0.5–1.4)
EOSINOPHIL # BLD AUTO: 0.11 K/UL (ref 0–0.51)
EOSINOPHIL NFR BLD: 1.1 % (ref 0–6.9)
ERYTHROCYTE [DISTWIDTH] IN BLOOD BY AUTOMATED COUNT: 44.8 FL (ref 35.9–50)
EST. AVERAGE GLUCOSE BLD GHB EST-MCNC: 108 MG/DL
FASTING STATUS PATIENT QL REPORTED: NORMAL
GLOBULIN SER CALC-MCNC: 3.4 G/DL (ref 1.9–3.5)
GLUCOSE SERPL-MCNC: 96 MG/DL (ref 65–99)
HBA1C MFR BLD: 5.4 % (ref 0–5.6)
HCT VFR BLD AUTO: 51 % (ref 42–52)
HDLC SERPL-MCNC: 41 MG/DL
HGB BLD-MCNC: 17.3 G/DL (ref 14–18)
IMM GRANULOCYTES # BLD AUTO: 0.06 K/UL (ref 0–0.11)
IMM GRANULOCYTES NFR BLD AUTO: 0.6 % (ref 0–0.9)
LDLC SERPL CALC-MCNC: 136 MG/DL
LYMPHOCYTES # BLD AUTO: 2.27 K/UL (ref 1–4.8)
LYMPHOCYTES NFR BLD: 22 % (ref 22–41)
MCH RBC QN AUTO: 30.9 PG (ref 27–33)
MCHC RBC AUTO-ENTMCNC: 33.9 G/DL (ref 33.7–35.3)
MCV RBC AUTO: 91.2 FL (ref 81.4–97.8)
MONOCYTES # BLD AUTO: 0.92 K/UL (ref 0–0.85)
MONOCYTES NFR BLD AUTO: 8.9 % (ref 0–13.4)
NEUTROPHILS # BLD AUTO: 6.9 K/UL (ref 1.82–7.42)
NEUTROPHILS NFR BLD: 66.6 % (ref 44–72)
NRBC # BLD AUTO: 0 K/UL
NRBC BLD-RTO: 0 /100 WBC
PLATELET # BLD AUTO: 345 K/UL (ref 164–446)
PMV BLD AUTO: 10.7 FL (ref 9–12.9)
POTASSIUM SERPL-SCNC: 4 MMOL/L (ref 3.6–5.5)
PROT SERPL-MCNC: 7.7 G/DL (ref 6–8.2)
RBC # BLD AUTO: 5.59 M/UL (ref 4.7–6.1)
SARS-COV-2 AB SERPL QL IA: NORMAL
SODIUM SERPL-SCNC: 134 MMOL/L (ref 135–145)
TRIGL SERPL-MCNC: 80 MG/DL (ref 0–149)
TSH SERPL DL<=0.005 MIU/L-ACNC: 2.55 UIU/ML (ref 0.38–5.33)
WBC # BLD AUTO: 10.3 K/UL (ref 4.8–10.8)

## 2020-10-05 PROCEDURE — 36415 COLL VENOUS BLD VENIPUNCTURE: CPT

## 2020-10-05 PROCEDURE — 84443 ASSAY THYROID STIM HORMONE: CPT

## 2020-10-05 PROCEDURE — 80061 LIPID PANEL: CPT

## 2020-10-05 PROCEDURE — 80053 COMPREHEN METABOLIC PANEL: CPT

## 2020-10-05 PROCEDURE — 85025 COMPLETE CBC W/AUTO DIFF WBC: CPT

## 2020-10-05 PROCEDURE — 86769 SARS-COV-2 COVID-19 ANTIBODY: CPT

## 2020-10-05 PROCEDURE — 83036 HEMOGLOBIN GLYCOSYLATED A1C: CPT

## 2020-10-05 PROCEDURE — 82306 VITAMIN D 25 HYDROXY: CPT

## 2020-10-06 ENCOUNTER — OFFICE VISIT (OUTPATIENT)
Dept: MEDICAL GROUP | Facility: CLINIC | Age: 40
End: 2020-10-06
Payer: COMMERCIAL

## 2020-10-06 VITALS
SYSTOLIC BLOOD PRESSURE: 132 MMHG | RESPIRATION RATE: 18 BRPM | HEART RATE: 93 BPM | OXYGEN SATURATION: 95 % | DIASTOLIC BLOOD PRESSURE: 86 MMHG | HEIGHT: 72 IN | TEMPERATURE: 98.6 F | WEIGHT: 315 LBS | BODY MASS INDEX: 42.66 KG/M2

## 2020-10-06 DIAGNOSIS — M17.11 PRIMARY OSTEOARTHRITIS OF RIGHT KNEE: ICD-10-CM

## 2020-10-06 PROCEDURE — 99213 OFFICE O/P EST LOW 20 MIN: CPT | Mod: 25 | Performed by: FAMILY MEDICINE

## 2020-10-06 PROCEDURE — 20611 DRAIN/INJ JOINT/BURSA W/US: CPT | Mod: RT | Performed by: FAMILY MEDICINE

## 2020-10-06 RX ORDER — TRIAMCINOLONE ACETONIDE 40 MG/ML
40 INJECTION, SUSPENSION INTRA-ARTICULAR; INTRAMUSCULAR ONCE
Status: COMPLETED | OUTPATIENT
Start: 2020-10-06 | End: 2020-10-06

## 2020-10-06 RX ADMIN — TRIAMCINOLONE ACETONIDE 40 MG: 40 INJECTION, SUSPENSION INTRA-ARTICULAR; INTRAMUSCULAR at 08:56

## 2020-10-06 SDOH — ECONOMIC STABILITY: TRANSPORTATION INSECURITY
IN THE PAST 12 MONTHS, HAS LACK OF RELIABLE TRANSPORTATION KEPT YOU FROM MEDICAL APPOINTMENTS, MEETINGS, WORK OR FROM GETTING THINGS NEEDED FOR DAILY LIVING?: DECLINE

## 2020-10-06 SDOH — ECONOMIC STABILITY: TRANSPORTATION INSECURITY
IN THE PAST 12 MONTHS, HAS THE LACK OF TRANSPORTATION KEPT YOU FROM MEDICAL APPOINTMENTS OR FROM GETTING MEDICATIONS?: DECLINE

## 2020-10-06 SDOH — HEALTH STABILITY: MENTAL HEALTH
STRESS IS WHEN SOMEONE FEELS TENSE, NERVOUS, ANXIOUS, OR CAN'T SLEEP AT NIGHT BECAUSE THEIR MIND IS TROUBLED. HOW STRESSED ARE YOU?: DECLINE

## 2020-10-06 SDOH — ECONOMIC STABILITY: HOUSING INSECURITY
IN THE LAST 12 MONTHS, WAS THERE A TIME WHEN YOU DID NOT HAVE A STEADY PLACE TO SLEEP OR SLEPT IN A SHELTER (INCLUDING NOW)?: PATIENT REFUSED

## 2020-10-06 SDOH — HEALTH STABILITY: PHYSICAL HEALTH: ON AVERAGE, HOW MANY MINUTES DO YOU ENGAGE IN EXERCISE AT THIS LEVEL?: DECLINE

## 2020-10-06 SDOH — ECONOMIC STABILITY: HOUSING INSECURITY

## 2020-10-06 SDOH — HEALTH STABILITY: PHYSICAL HEALTH: ON AVERAGE, HOW MANY DAYS PER WEEK DO YOU ENGAGE IN MODERATE TO STRENUOUS EXERCISE (LIKE A BRISK WALK)?: DECLINE

## 2020-10-06 SDOH — ECONOMIC STABILITY: INCOME INSECURITY: IN THE LAST 12 MONTHS, WAS THERE A TIME WHEN YOU WERE NOT ABLE TO PAY THE MORTGAGE OR RENT ON TIME?: PATIENT REFUSED

## 2020-10-06 ASSESSMENT — FIBROSIS 4 INDEX: FIB4 SCORE: 0.57

## 2020-10-06 ASSESSMENT — SOCIAL DETERMINANTS OF HEALTH (SDOH)
DO YOU BELONG TO ANY CLUBS OR ORGANIZATIONS SUCH AS CHURCH GROUPS UNIONS, FRATERNAL OR ATHLETIC GROUPS, OR SCHOOL GROUPS?: DECLINE
WITHIN THE PAST 12 MONTHS, THE FOOD YOU BOUGHT JUST DIDN'T LAST AND YOU DIDN'T HAVE MONEY TO GET MORE: DECLINE
HOW OFTEN DO YOU ATTEND CHURCH OR RELIGIOUS SERVICES?: DECLINE
HOW HARD IS IT FOR YOU TO PAY FOR THE VERY BASICS LIKE FOOD, HOUSING, MEDICAL CARE, AND HEATING?: DECLINE
ARE YOU MARRIED, WIDOWED, DIVORCED, SEPARATED, NEVER MARRIED, OR LIVING WITH A PARTNER?: DECLINE
HOW OFTEN DO YOU ATTENT MEETINGS OF THE CLUB OR ORGANIZATION YOU BELONG TO?: DECLINE
IN A TYPICAL WEEK, HOW MANY TIMES DO YOU TALK ON THE PHONE WITH FAMILY, FRIENDS, OR NEIGHBORS?: DECLINE
HOW OFTEN DO YOU HAVE A DRINK CONTAINING ALCOHOL: DECLINE
HOW OFTEN DO YOU GET TOGETHER WITH FRIENDS OR RELATIVES?: DECLINE
HOW MANY DRINKS CONTAINING ALCOHOL DO YOU HAVE ON A TYPICAL DAY WHEN YOU ARE DRINKING: DECLINE
HOW OFTEN DO YOU HAVE SIX OR MORE DRINKS ON ONE OCCASION: DECLINE
WITHIN THE PAST 12 MONTHS, YOU WORRIED THAT YOUR FOOD WOULD RUN OUT BEFORE YOU GOT THE MONEY TO BUY MORE: DECLINE

## 2020-10-06 NOTE — PROCEDURES
PROCEDURE NOTE:  right knee ASPIRATION/WITH CORTICOSTEROID  injection under ultrasound guidance for direct needle visualization  Consent was obtained, using sterile technique the knee was prepped  Supra-lateral patellar approach.   18 G needle for aspiration of 19 cc of straw colored fluid and the needle withdrawn.    Using the same port 5 cc marcaine and 40 mg kenalog injected into the knee joint  The procedure was well tolerated.    Watch for fever, or increased swelling or persistent pain in knee. Call or return to clinic prn if such symptoms occur or the knee fails to improve as anticipated.

## 2020-10-06 NOTE — PROGRESS NOTES
CHIEF COMPLAINT:    Oswald Schuler is complaining of NEW ONSET RIGHT knee pain and swelling  Insidious onset, September 25, 2020  Pain is at the anterolateral and anterior knee  Quality is aching, sharp, pressure  Pain is non-radiating   Improved with resting compression   Aggravated by standing, walking  Previous hip injury, hyperextension injury at age 17 and another injury in his 20's  Prior Treatments: seen at   Prior studies: NO Prior imaging has been done   Medications tried for pain include: ibuprofen (OTC) with mild improvement  Mechanical Symptom history: No Locking and Grinding which can be uncomfortable    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 No pertinent past medical history.    PMH:  has a past medical history of Acute right-sided low back pain without sciatica (10/8/2019), BMI 38.0-38.9,adult (8/9/2012), Coccyx pain (2010), Dyslipidemia (4/17/2019), and Hypertension.  MEDS:   Current Outpatient Medications:   •  chlorhexidine (PERIDEX) 0.12 % Solution, Take 15 mL by mouth 2 times a day., Disp: 60 mL, Rfl: 0  •  amLODIPine (NORVASC) 5 MG Tab, TAKE ONE TABLET BY MOUTH ONE TIME DAILY, Disp: 90 Tab, Rfl: 3  •  hydroCHLOROthiazide (HYDRODIURIL) 25 MG Tab, TAKE ONE TABLET BY MOUTH ONE TIME DAILY, Disp: 90 Tab, Rfl: 3  •  lisinopril (PRINIVIL) 20 MG Tab, TAKE ONE TABLET BY MOUTH ONE TIME DAILY, Disp: 90 Tab, Rfl: 3  •  diclofenac EC (VOLTAREN) 75 MG Tablet Delayed Response, Take 1 Tab by mouth 2 times a day. (Patient not taking: Reported on 4/16/2020), Disp: 30 Tab, Rfl: 0  •  Diclofenac Sodium 1 % Gel, Apply BID to knee for pain (Patient not taking: Reported on 4/16/2020), Disp: 1 Tube, Rfl: 0  •  vitamin D, Ergocalciferol, (DRISDOL) 1.25 MG (01463 UT) Cap capsule, take one capsule by mouth once a week, Disp: 12 Cap, Rfl:  1  •  Coenzyme Q10 (CO Q-10) 100 MG Cap, Take  by mouth., Disp: , Rfl:   •  Multiple Vitamin (MULTI VITAMIN DAILY PO), Take  by mouth., Disp: , Rfl:   ALLERGIES: No Known Allergies  SURGHX:   Past Surgical History:   Procedure Laterality Date   • MASS EXCISION GENERAL  2/10/2012    Performed by BRUNILDA LOPEZ at SURGERY [facility]   • LIPOMA EXCISION Left 2012    left buttock   • CYST EXCISION      right shoulder   • TONSILLECTOMY       SOCHX:  reports that he has been smoking cigarettes. He has never used smokeless tobacco. He reports current alcohol use of about 3.0 - 4.0 oz of alcohol per week. He reports that he does not use drugs.  FH: Family history was reviewed, no pertinent findings to report     PHYSICAL EXAM:  /86 (BP Location: Right arm, Patient Position: Sitting, BP Cuff Size: Large adult)   Pulse 93   Temp 37 °C (98.6 °F) (Temporal)   Resp 18   Ht 1.829 m (6')   Wt (!) 153.8 kg (339 lb)   SpO2 95%   BMI 45.98 kg/m²      obese in no apparent distress, alert and oriented x 3.  Gait: antalgic     RIGHT Knee:  Slight Varus and Swelling   Range of Motion Slightly limited with Flexion  3+ effusion  Patellar No tenderness and no apprehension  Medial Joint Line Tenderness and NEGATIVE Rebecca  Lateral Joint Line Non-tender and NEGATIVE Rebecca  Trace Laxity with Varus stress  Trace Laxity with Valgus stress  Lachman's testing is Trace  Posterior Drawer Testing is Trace  The leg is otherwise neurovascularly intact    LEFT Knee:  Slight Varus and No Swelling   Range of Motion Intact  Trace effusion  Patellar No tenderness and no apprehension  Medial Joint Line Non-tender and NEGATIVE Rebecca  Lateral Joint Line Non-tender and NEGATIVE Rebecca  Trace Laxity with Varus stress  Trace Laxity with Valgus stress  Lachman's testing is Trace  Posterior Drawer Testing is Trace  The leg is otherwise neurovascularly intact    Additional Findings: None    1. Primary osteoarthritis of right knee   triamcinolone acetonide (KENALOG-40) injection 40 mg     Insidious onset, September 25, 2020  Pain is at the anterolateral and anterior knee    Known history of osteoarthritis  Aspiration/corticosteroid injection performed in the office TODAY (October 6, 2020)  Removed approximately 19 cc of straw-colored fluid and injected 40 mg of Kenalog in the RIGHT knee    Follow-up as needed, patient plans to move in November back to [place]        3/24/2020 8:42 AM     HISTORY/REASON FOR EXAM:  Atraumatic Pain/Swelling/Deformity.        TECHNIQUE/EXAM DESCRIPTION AND NUMBER OF VIEWS:   1 views of the standing bilateral knees.     COMPARISON: None     FINDINGS:  There is mild medial tibiofemoral joint space narrowing bilaterally. There is spurring of the medial femoral condyle and tibial plateau on the right and mild spurring of the lateral femoral condyle and tibial plateau on the left. No fracture or   dislocation is seen.     IMPRESSION:     No fracture or dislocation is seen.     Mild degenerative changes.       Interpreted in the office today with the patient          3/24/2020 8:40 AM     HISTORY/REASON FOR EXAM:  Atraumatic Pain/Swelling/Deformity; Bilateral sunrise view        TECHNIQUE/EXAM DESCRIPTION AND NUMBER OF VIEWS:  1 views of the LEFT knee.     COMPARISON: None     FINDINGS:  There is mild patellofemoral joint space narrowing and spurring of the patella bilaterally. No dislocation is seen.     IMPRESSION:     No dislocation.     Mild degenerative changes.      3/24/2020 8:42 AM     HISTORY/REASON FOR EXAM:  Atraumatic Pain/Swelling/Deformity.        TECHNIQUE/EXAM DESCRIPTION AND NUMBER OF VIEWS:   1 views of the standing bilateral knees.     COMPARISON: None     FINDINGS:  There is mild medial tibiofemoral joint space narrowing bilaterally. There is spurring of the medial femoral condyle and tibial plateau on the right and mild spurring of the lateral femoral condyle and tibial plateau on the left. No  fracture or   dislocation is seen.     IMPRESSION:     No fracture or dislocation is seen.     Mild degenerative changes.          3/16/2020 9:50 AM     HISTORY/REASON FOR EXAM:  Left knee pain        TECHNIQUE/EXAM DESCRIPTION AND NUMBER OF VIEWS:  3 views of the LEFT knee.     COMPARISON: None     FINDINGS:  Bone density is normal.  There is no evidence of fracture or dislocation.  There is mild joint space narrowing and periarticular sclerosis and marginal spurring.  There is large joint effusion.     IMPRESSION:     No evidence of fracture or dislocation.  Large joint effusion is identified. Mild osteoarthritis is present.

## 2020-11-10 ENCOUNTER — PATIENT MESSAGE (OUTPATIENT)
Dept: MEDICAL GROUP | Facility: PHYSICIAN GROUP | Age: 40
End: 2020-11-10

## 2020-11-10 DIAGNOSIS — E55.9 VITAMIN D DEFICIENCY: ICD-10-CM

## 2020-11-10 DIAGNOSIS — I10 ESSENTIAL HYPERTENSION: ICD-10-CM

## 2020-11-11 RX ORDER — LISINOPRIL 20 MG/1
TABLET ORAL
Qty: 90 TAB | Refills: 3 | Status: SHIPPED | OUTPATIENT
Start: 2020-11-11

## 2020-11-11 RX ORDER — HYDROCHLOROTHIAZIDE 25 MG/1
TABLET ORAL
Qty: 90 TAB | Refills: 3 | Status: SHIPPED | OUTPATIENT
Start: 2020-11-11

## 2020-11-11 RX ORDER — AMLODIPINE BESYLATE 5 MG/1
TABLET ORAL
Qty: 90 TAB | Refills: 3 | Status: SHIPPED | OUTPATIENT
Start: 2020-11-11

## 2020-11-11 RX ORDER — ERGOCALCIFEROL 1.25 MG/1
50000 CAPSULE ORAL
Qty: 12 CAP | Refills: 0 | Status: SHIPPED | OUTPATIENT
Start: 2020-11-11

## 2020-11-11 NOTE — PROGRESS NOTES
Requested Prescriptions     Signed Prescriptions Disp Refills   • amLODIPine (NORVASC) 5 MG Tab 90 Tab 3     Sig: TAKE ONE TABLET BY MOUTH ONE TIME DAILY     Authorizing Provider: ITALIA BEAVERS   • hydroCHLOROthiazide (HYDRODIURIL) 25 MG Tab 90 Tab 3     Sig: TAKE ONE TABLET BY MOUTH ONE TIME DAILY     Authorizing Provider: ITALIA BEAVERS   • lisinopril (PRINIVIL) 20 MG Tab 90 Tab 3     Sig: TAKE ONE TABLET BY MOUTH ONE TIME DAILY     Authorizing Provider: ITALIA BEAVERS   • vitamin D, Ergocalciferol, (DRISDOL) 1.25 MG (61504 UT) Cap capsule 12 Cap 0     Sig: Take 1 Cap by mouth every 7 days.     Authorizing Provider: ITALIA BEAVERS A.P.R.N.